# Patient Record
Sex: FEMALE | Race: WHITE | NOT HISPANIC OR LATINO | Employment: OTHER | URBAN - METROPOLITAN AREA
[De-identification: names, ages, dates, MRNs, and addresses within clinical notes are randomized per-mention and may not be internally consistent; named-entity substitution may affect disease eponyms.]

---

## 2017-01-31 ENCOUNTER — GENERIC CONVERSION - ENCOUNTER (OUTPATIENT)
Dept: OTHER | Facility: OTHER | Age: 67
End: 2017-01-31

## 2017-03-07 ENCOUNTER — APPOINTMENT (OUTPATIENT)
Dept: LAB | Facility: CLINIC | Age: 67
End: 2017-03-07
Payer: MEDICARE

## 2017-03-07 DIAGNOSIS — E55.9 VITAMIN D DEFICIENCY: ICD-10-CM

## 2017-03-07 DIAGNOSIS — I10 ESSENTIAL (PRIMARY) HYPERTENSION: ICD-10-CM

## 2017-03-07 DIAGNOSIS — Z00.00 ENCOUNTER FOR GENERAL ADULT MEDICAL EXAMINATION WITHOUT ABNORMAL FINDINGS: ICD-10-CM

## 2017-03-07 LAB
25(OH)D3 SERPL-MCNC: 32.7 NG/ML (ref 30–100)
ALBUMIN SERPL BCP-MCNC: 3.3 G/DL (ref 3.5–5)
ALP SERPL-CCNC: 47 U/L (ref 46–116)
ALT SERPL W P-5'-P-CCNC: 35 U/L (ref 12–78)
ANION GAP SERPL CALCULATED.3IONS-SCNC: 8 MMOL/L (ref 4–13)
AST SERPL W P-5'-P-CCNC: 18 U/L (ref 5–45)
BILIRUB SERPL-MCNC: 0.48 MG/DL (ref 0.2–1)
BUN SERPL-MCNC: 17 MG/DL (ref 5–25)
CALCIUM SERPL-MCNC: 8.5 MG/DL (ref 8.3–10.1)
CHLORIDE SERPL-SCNC: 106 MMOL/L (ref 100–108)
CHOLEST SERPL-MCNC: 170 MG/DL (ref 50–200)
CO2 SERPL-SCNC: 28 MMOL/L (ref 21–32)
CREAT SERPL-MCNC: 0.69 MG/DL (ref 0.6–1.3)
GFR SERPL CREATININE-BSD FRML MDRD: >60 ML/MIN/1.73SQ M
GLUCOSE SERPL-MCNC: 85 MG/DL (ref 65–140)
HDLC SERPL-MCNC: 86 MG/DL (ref 40–60)
LDLC SERPL CALC-MCNC: 75 MG/DL (ref 0–100)
POTASSIUM SERPL-SCNC: 3.8 MMOL/L (ref 3.5–5.3)
PROT SERPL-MCNC: 7 G/DL (ref 6.4–8.2)
SODIUM SERPL-SCNC: 142 MMOL/L (ref 136–145)
TRIGL SERPL-MCNC: 44 MG/DL
TSH SERPL DL<=0.05 MIU/L-ACNC: 2.83 UIU/ML (ref 0.36–3.74)

## 2017-03-07 PROCEDURE — 36415 COLL VENOUS BLD VENIPUNCTURE: CPT

## 2017-03-07 PROCEDURE — 84443 ASSAY THYROID STIM HORMONE: CPT

## 2017-03-07 PROCEDURE — 80053 COMPREHEN METABOLIC PANEL: CPT

## 2017-03-07 PROCEDURE — 82306 VITAMIN D 25 HYDROXY: CPT

## 2017-03-07 PROCEDURE — 80061 LIPID PANEL: CPT

## 2017-03-16 ENCOUNTER — ALLSCRIPTS OFFICE VISIT (OUTPATIENT)
Dept: OTHER | Facility: OTHER | Age: 67
End: 2017-03-16

## 2017-03-16 DIAGNOSIS — Z12.11 ENCOUNTER FOR SCREENING FOR MALIGNANT NEOPLASM OF COLON: ICD-10-CM

## 2017-03-16 DIAGNOSIS — R00.2 PALPITATIONS: ICD-10-CM

## 2017-03-16 DIAGNOSIS — R06.09 OTHER FORMS OF DYSPNEA: ICD-10-CM

## 2017-03-20 ENCOUNTER — TRANSCRIBE ORDERS (OUTPATIENT)
Dept: MRI IMAGING | Facility: CLINIC | Age: 67
End: 2017-03-20

## 2017-03-20 ENCOUNTER — GENERIC CONVERSION - ENCOUNTER (OUTPATIENT)
Dept: OTHER | Facility: OTHER | Age: 67
End: 2017-03-20

## 2017-03-20 ENCOUNTER — APPOINTMENT (OUTPATIENT)
Dept: LAB | Facility: CLINIC | Age: 67
End: 2017-03-20
Payer: MEDICARE

## 2017-03-20 ENCOUNTER — HOSPITAL ENCOUNTER (OUTPATIENT)
Dept: RADIOLOGY | Facility: CLINIC | Age: 67
Discharge: HOME | End: 2017-03-20
Payer: MEDICARE

## 2017-03-20 DIAGNOSIS — Z12.11 ENCOUNTER FOR SCREENING FOR MALIGNANT NEOPLASM OF COLON: ICD-10-CM

## 2017-03-20 DIAGNOSIS — R00.2 PALPITATIONS: ICD-10-CM

## 2017-03-20 LAB — HEMOCCULT STL QL IA: NEGATIVE

## 2017-03-20 PROCEDURE — G0328 FECAL BLOOD SCRN IMMUNOASSAY: HCPCS

## 2017-03-20 PROCEDURE — 71020 HB CHEST X-RAY 2VW FRONTAL&LATL: CPT

## 2017-03-22 ENCOUNTER — GENERIC CONVERSION - ENCOUNTER (OUTPATIENT)
Dept: OTHER | Facility: OTHER | Age: 67
End: 2017-03-22

## 2017-04-17 ENCOUNTER — GENERIC CONVERSION - ENCOUNTER (OUTPATIENT)
Dept: OTHER | Facility: OTHER | Age: 67
End: 2017-04-17

## 2017-04-17 ENCOUNTER — HOSPITAL ENCOUNTER (OUTPATIENT)
Dept: NON INVASIVE DIAGNOSTICS | Facility: CLINIC | Age: 67
Discharge: HOME | End: 2017-04-17
Payer: MEDICARE

## 2017-04-17 DIAGNOSIS — R00.2 PALPITATIONS: ICD-10-CM

## 2017-04-17 DIAGNOSIS — R06.09 OTHER FORMS OF DYSPNEA: ICD-10-CM

## 2017-04-17 LAB
CHEST PAIN STATEMENT: NORMAL
MAX DIASTOLIC BP: 90 MMHG
MAX HEART RATE: 176 BPM
MAX PREDICTED HEART RATE: 153 BPM
MAX. SYSTOLIC BP: 200 MMHG
PROTOCOL NAME: NORMAL
REASON FOR TERMINATION: NORMAL
TARGET HR FORMULA: NORMAL
TIME IN EXERCISE PHASE: 361 S

## 2017-04-17 PROCEDURE — 93226 XTRNL ECG REC<48 HR SCAN A/R: CPT

## 2017-04-17 PROCEDURE — 93350 STRESS TTE ONLY: CPT

## 2017-04-17 PROCEDURE — 93225 XTRNL ECG REC<48 HRS REC: CPT

## 2017-04-21 ENCOUNTER — GENERIC CONVERSION - ENCOUNTER (OUTPATIENT)
Dept: OTHER | Facility: OTHER | Age: 67
End: 2017-04-21

## 2017-05-01 ENCOUNTER — ALLSCRIPTS OFFICE VISIT (OUTPATIENT)
Dept: OTHER | Facility: OTHER | Age: 67
End: 2017-05-01

## 2017-05-02 ENCOUNTER — ALLSCRIPTS OFFICE VISIT (OUTPATIENT)
Dept: OTHER | Facility: OTHER | Age: 67
End: 2017-05-02

## 2017-10-07 ENCOUNTER — APPOINTMENT (EMERGENCY)
Dept: CT IMAGING | Facility: HOSPITAL | Age: 67
End: 2017-10-07
Payer: MEDICARE

## 2017-10-07 ENCOUNTER — HOSPITAL ENCOUNTER (EMERGENCY)
Facility: HOSPITAL | Age: 67
Discharge: HOME | End: 2017-10-07
Attending: EMERGENCY MEDICINE | Admitting: EMERGENCY MEDICINE
Payer: MEDICARE

## 2017-10-07 VITALS
TEMPERATURE: 97.5 F | SYSTOLIC BLOOD PRESSURE: 195 MMHG | DIASTOLIC BLOOD PRESSURE: 96 MMHG | HEART RATE: 76 BPM | OXYGEN SATURATION: 96 % | RESPIRATION RATE: 16 BRPM

## 2017-10-07 DIAGNOSIS — S00.83XA CONTUSION OF FACE, INITIAL ENCOUNTER: Primary | ICD-10-CM

## 2017-10-07 PROCEDURE — 99283 EMERGENCY DEPT VISIT LOW MDM: CPT

## 2017-10-07 PROCEDURE — 70450 CT HEAD/BRAIN W/O DYE: CPT

## 2017-10-07 PROCEDURE — 70486 CT MAXILLOFACIAL W/O DYE: CPT

## 2017-10-07 RX ORDER — IBUPROFEN 600 MG/1
600 TABLET ORAL EVERY 6 HOURS PRN
Qty: 30 TABLET | Refills: 0 | Status: SHIPPED | OUTPATIENT
Start: 2017-10-07 | End: 2018-10-25

## 2017-10-07 RX ORDER — METHOCARBAMOL 500 MG/1
1000 TABLET, FILM COATED ORAL ONCE
Status: COMPLETED | OUTPATIENT
Start: 2017-10-07 | End: 2017-10-07

## 2017-10-07 RX ORDER — IBUPROFEN 600 MG/1
600 TABLET ORAL ONCE
Status: COMPLETED | OUTPATIENT
Start: 2017-10-07 | End: 2017-10-07

## 2017-10-07 RX ORDER — METHOCARBAMOL 750 MG/1
750 TABLET, FILM COATED ORAL 3 TIMES DAILY
Qty: 15 TABLET | Refills: 0 | Status: SHIPPED | OUTPATIENT
Start: 2017-10-07 | End: 2018-10-25 | Stop reason: ALTCHOICE

## 2017-10-07 RX ADMIN — IBUPROFEN 600 MG: 600 TABLET ORAL at 18:20

## 2017-10-07 RX ADMIN — METHOCARBAMOL 1000 MG: 500 TABLET ORAL at 18:19

## 2017-10-07 NOTE — ED PROVIDER NOTES
History  Chief Complaint   Patient presents with    Fall     patient presents to the ED post fall with c/o right cheek and eye pain as well as b/l wrist pain  right cheek is ecchymotic  patient states she takes ASA daily      39-year-old female patient, on antiplatelet agents, presents to the emergency department for evaluation of right-sided facial contusion sustained in a fall on stairs  The patient states tripping, falling, striking her face on the steps  The patient does have some obvious deformity of her nose  The patient has some contusion and ecchymosis around the right eye  The patient has no ocular entrapment with all extraocular muscles intact  The patient's neurologic exam is benign  The patient's neck was cleared via nexus criteria  Patient of the CT scan done of the head and face  Patient does state some malocclusion however not is obviously apparent externally          History provided by:  Patient   used: No    Fall   Mechanism of injury: fall    Injury location:  Face and head/neck  Head/neck injury location:  Head  Facial injury location:  Face  Arrived directly from scene: yes    Fall:     Fall occurred:  Down stairs    Point of impact:  Head    Entrapped after fall: no    Protective equipment: none    Suspicion of alcohol use: no    Suspicion of drug use: no    Prior to arrival data:     Bystander interventions:  None    Patient ambulatory at scene: no      Blood loss:  None    Loss of consciousness: no      Amnesic to event: no      Airway interventions:  None    Breathing interventions:  None    IV access status:  None    IO access:  None    Fluids administered:  None    Airway condition since incident:  Stable    Breathing condition since incident:  Stable    Circulation condition since incident:  Stable  Associated symptoms: no abdominal pain, no back pain, no blindness, no difficulty breathing, no headaches, no hearing loss, no nausea, no neck pain and no seizures Risk factors: no AICD, no anticoagulation therapy, no CHF, no COPD, no kidney disease and no pacemaker        None       No past medical history on file  No past surgical history on file  No family history on file  I have reviewed and agree with the history as documented  Social History   Substance Use Topics    Smoking status: Not on file    Smokeless tobacco: Not on file    Alcohol use Not on file        Review of Systems   HENT: Negative for hearing loss  Eyes: Negative for blindness  Gastrointestinal: Negative for abdominal pain and nausea  Musculoskeletal: Negative for back pain and neck pain  Neurological: Negative for seizures and headaches  All other systems reviewed and are negative  Physical Exam  ED Triage Vitals [10/07/17 1658]   Temperature Pulse Respirations Blood Pressure SpO2   97 5 °F (36 4 °C) 75 16 (!) 195/96 96 %      Temp src Heart Rate Source Patient Position - Orthostatic VS BP Location FiO2 (%)   -- -- -- -- --      Pain Score       5           Physical Exam   Constitutional: She is oriented to person, place, and time  She appears well-developed and well-nourished  HENT:   Head: Normocephalic and atraumatic  Right Ear: External ear normal    Left Ear: External ear normal    Eyes: Conjunctivae and EOM are normal    Neck: No JVD present  No tracheal deviation present  No thyromegaly present  Cardiovascular: Normal rate  Pulmonary/Chest: Effort normal and breath sounds normal  No stridor  Abdominal: Soft  She exhibits no distension and no mass  There is no tenderness  There is no guarding  No hernia  Musculoskeletal: Normal range of motion  She exhibits no edema, tenderness or deformity  Lymphadenopathy:     She has no cervical adenopathy  Neurological: She is alert and oriented to person, place, and time  Skin: Skin is warm  No rash noted  No erythema  No pallor  Psychiatric: She has a normal mood and affect   Her behavior is normal    Nursing note and vitals reviewed  ED Medications  Medications   methocarbamol (ROBAXIN) tablet 1,000 mg (1,000 mg Oral Given 10/7/17 1819)   ibuprofen (MOTRIN) tablet 600 mg (600 mg Oral Given 10/7/17 1820)       Diagnostic Studies  Labs Reviewed - No data to display    CT facial bones without contrast   Final Result      Normal noncontrast CT of the facial bones  Workstation performed: [id number]         CT head without contrast   Final Result      No acute intracranial abnormality  Workstation performed: [id number]             Procedures  Procedures      Phone Contacts  ED Phone Contact    ED Course  ED Course                                MDM  Number of Diagnoses or Management Options  Contusion of face, initial encounter: new and requires workup     Amount and/or Complexity of Data Reviewed  Tests in the radiology section of CPT®: ordered and reviewed  Decide to obtain previous medical records or to obtain history from someone other than the patient: yes  Review and summarize past medical records: yes    Patient Progress  Patient progress: stable    CritCare Time    Disposition  Final diagnoses:   Contusion of face, initial encounter     ED Disposition     ED Disposition Condition Comment    Discharge  Ahmad Collar discharge to home/self care      Condition at discharge: Stable        Follow-up Information     Follow up With Specialties Details Why [address] [place], DO Family Medicine  As needed [address] [place] [address]  [phone]          Discharge Medication List as of 10/7/2017  5:57 PM      START taking these medications    Details   ibuprofen (MOTRIN) 600 mg tablet Take 1 tablet by mouth every 6 (six) hours as needed for mild pain for up to 3 days, Starting Sat 10/7/2017, Until Tue 10/10/2017, Print      methocarbamol (ROBAXIN) 750 mg tablet Take 1 tablet by mouth 3 (three) times a day for 5 days, Starting Sat 10/7/2017, Until Thu 10/12/2017, Print No discharge procedures on file      ED Provider  Electronically Signed by       Ashley John DO  10/09/17 5750

## 2017-11-03 ENCOUNTER — ALLSCRIPTS OFFICE VISIT (OUTPATIENT)
Dept: OTHER | Facility: OTHER | Age: 67
End: 2017-11-03

## 2017-11-03 DIAGNOSIS — I10 ESSENTIAL (PRIMARY) HYPERTENSION: ICD-10-CM

## 2017-12-14 ENCOUNTER — GENERIC CONVERSION - ENCOUNTER (OUTPATIENT)
Dept: OBGYN CLINIC | Facility: CLINIC | Age: 67
End: 2017-12-14

## 2017-12-20 ENCOUNTER — ALLSCRIPTS OFFICE VISIT (OUTPATIENT)
Dept: OTHER | Facility: OTHER | Age: 67
End: 2017-12-20

## 2017-12-21 NOTE — PROGRESS NOTES
Assessment   1  History of Asymptomatic postmenopausal state (V49 81) (Z78 0)   2  History of screening mammography (V15 89) (Z92 89)    Plan   Dense breasts, PMH: History of screening mammography    · MAMMO SCREENING BILATERAL W 3D & CAD; Status:Hold For - Scheduling; Requested    for:22Ifj4784;    Perform:[facility]; Due:67Dnr5290; Ordered; For:Dense breasts, PMH: History of screening mammography; Ordered By:Nel Brandon;           Call with questions or concerns       Discussion/Summary   Goals and Barriers: The patient has the current Goals: Hcm  The patent has the current Barriers: None  Patient's Capacity to Self-Care: Patient is able to Self-Care  Medication SE Review and Pt Understands Tx: Possible side effects of new medications were reviewed with the patient/guardian today  Self Referrals:    Self Referrals: Yes     Female, Adult: healthy adult female Currently, she eats an adequate diet  cervical cancer screening is current cervical cancer screening is not indicated Breast cancer screening: monthly self breast exam was advised, mammogram has been ordered and mammogram is needed every year  Colorectal cancer screening: colorectal cancer screening is current, colonoscopy is needed every ten years and the next colonoscopy is due 2020  Osteoporosis screening: the next bone mineral density test is due 2019 and Followed by PCP, nml BMD  Advice and education were given regarding weight bearing exercise, calcium supplements and vitamin D supplements  Chief Complaint   Chief Complaint Free Text Note Form: here for bi annual exam      History of Present Illness   HPI: Patient Is a 13-year-old postmenopausal female here for pelvic/breast exam  She was a previous [facility] patient  She denies postmenopausal bleeding  She denies abnormal Pap history  She denies vaginal symptoms  She would like to go to exams every 2 yrs if possible  GYN HM, Adult Female [facility]:  The patient is being seen for a gynecology evaluation  The last health maintenance visit was 2 year(s) ago  General Health: The patient's health since the last visit is described as good  Lifestyle:  She exercises regularly  Exercise includes walking -- She does not use tobacco  The patient has never smoked cigarettes  -- She consumes alcohol  She reports occasional alcohol use  Reproductive health: the patient is postmenopausal--   she reports no menstrual problems  Menstrual history:  age at menarche was 14 -- she uses no contraception  -- she is not sexually active  -- pregnancy history: G 3P 2(miscarriages: 1 )  Screening: Cervical cancer screening includes a pap smear performed 12/2014 per pt  Breast cancer screening includes a mammogram performed 12/6/16  Colorectal cancer screening includes a colonoscopy performed within the past ten years  Metabolic screening includes DEXA performed within the past five years  Review of Systems   Focused-Female:      Constitutional: No fever, no chills, feels well, no tiredness, no recent weight gain or loss  Breasts: no complaints of breast pain, breast lump or nipple discharge  Gastrointestinal: no complaints of abdominal pain, no constipation, no nausea or diarrhea, no vomiting, no bloody stools  Genitourinary: no complaints of dysuria, no incontinence, no pelvic pain, no dysmenorrhea, no vaginal discharge or abnormal vaginal bleeding  ROS Reviewed:    ROS reviewed  Active Problems   1  MIRELES (dyspnea on exertion) (786 09) (R06 09)   2  Hypertension (401 9) (I10)   3  Lumbar spine pain (724 2) (M54 5)   4  Malignant melanoma of skin (172 9) (C43 9)   5  Mild vitamin D deficiency (268 9) (E55 9)    Past Medical History   1  History of Asymptomatic postmenopausal state (V49 81) (Z78 0)   2  History of Avascular necrosis of hip (733 42) (M87 059)   3  History of dizziness (V13 89) (Z87 898)   4  History of influenza vaccination (V49 89) (Z92 29)   5   History of palpitations (V12 59) (Z87 898)   6  History of shortness of breath (V13 89) (Z87 898)   7  History of 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 (172 7) (C43 71)   8  History of Medicare annual wellness visit, subsequent (V70 0) (Z00 00)  Active Problems And Past Medical History Reviewed: The active problems and past medical history were reviewed and updated today  Pregnancy Medical History: 2 daughters, 3 yo old grandson and  granddaughter,1 daughter and  live abroad (he is an astrophysicist-just lost his luggage on trip home)    Medical history:      Surgical History   1  History of Biopsy Skin   2  History of Breast Surgery Puncture Aspiration Of Cyst   3  History of Dilation And Curettage   4  History of Hallux Valgus (Bunion) Correction   5  History of Hip Surgery Right  Surgical History Reviewed: The surgical history was reviewed and updated today  Family History   Mother    1  Family history of Adenocarcinoma Of The Gallbladder   2  Family history of hypertension (V17 49) (Z82 49)  Father    3  Family history of Brain Cancer (V16 8)   4  Family history of hypertension (V17 49) (Z82 49)  Brother    5  Family history of Liver Cancer  Family History Reviewed: The family history was reviewed and updated today  Patient denies family history of breast, colon, uterine, ovarian or cervical cancers  Social History    · Being A Social Drinker   · Never A Smoker   · Not currently sexually active  Social History Reviewed: The social history was reviewed and updated today  Current Meds    1  Aspirin 81 MG TABS; Therapy: (Recorded:88Fsl7344) to Recorded   2  B Complex TABS; Therapy: (Recorded:38Lvh5494) to Recorded   3  Calcium + D TABS; Therapy: (Recorded:20Ikq6552) to Recorded   4  Fish Oil 1000 MG Oral Capsule; 1 cap PO BID; Therapy: 06UCM1708 to (Evaluate:06Bvd0242) Recorded   5  Lisinopril 30 MG Oral Tablet;  Take 1 tablet by mouth daily; Therapy: 51YHI4185 to (Evaluate:17Sys1297)  Requested for: 56Wfp2931; Last     Rx:58Tdm1158 Ordered   6  Magnesium TABS; Therapy: (Recorded:79Hry6692) to Recorded   7  Montelukast Sodium 10 MG Oral Tablet; Take 1 tablet by mouth at bedtime; Therapy: 46WCK0533 to (Evaluate:19Sca0810)  Requested for: 98Ndb9842; Last     Rx:17Nmr6798 Ordered   8  Multi-Vitamin TABS; Therapy: (Recorded:35Pgl6154) to Recorded  Medication List Reviewed: The medication list was reviewed and updated today  Allergies   1  No Known Drug Allergies    Vitals   Vital Signs    Recorded: 06KSQ2880 68:73WP   Systolic 655, RUE, Sitting   Diastolic 82, RUE, Sitting   Height 5 ft 6 in   Weight 212 lb    BMI Calculated 34 22   BSA Calculated 2 05   LMP postmeno     Physical Exam        Constitutional      General appearance: No acute distress, well appearing and well nourished  Pulmonary      Respiratory effort: No increased work of breathing or signs of respiratory distress  Genitourinary      External genitalia: Normal and no lesions appreciated  Vagina: Normal, no lesions or dryness appreciated  Urethral meatus: Normal        Bladder: Normal, soft, non-tender and no prolapse or masses appreciated  Cervix: Normal, no palpable masses  Uterus: Normal, non-tender, not enlarged, and no palpable masses  Adnexa/parametria: Normal, non-tender and no fullness or masses appreciated  Chest      Breasts: Normal and no dimpling or skin changes noted  Abdomen      Abdomen: Normal, non-tender, and no organomegaly noted         Psychiatric      Orientation to person, place, and time: Normal        Mood and affect: Normal        Future Appointments      Date/Time Provider Specialty Site   05/04/2018 11:00 AM Wade Galvan  Internal Medicine [place] [address]    Electronically signed by : Wily Heller, [facility]; Dec 20 2017 11:59AM EST (Author)     Electronically signed by : Janine Chau MD; Dec 20 2017 12:21PM EST

## 2018-01-10 NOTE — RESULT NOTES
Verified Results  (B) PAP (REFLEX TO HPV PLUS WHEN ASC-US) [id number] 10:17AM Scott Rossi     Test Name Result Flag Reference   PAP, LIQUID-BASED NILM     DIAGNOSIS:            Negative for intraepithelial lesion or malignancy  ADEQUACY:             Satisfactory for evaluation / Transformation zone                         component cannot be adequately evaluated due to                         atrophic smear  COMMENT:              This Pap smear was screened with the assistance                         of the RedeemrPrep(TM) Imaging System and                         screened by a cytotechnologist   SPECIMEN SOURCE:      PAP (RFLX HPV PLUS WHENASC-US), CERVIX  CLINICAL INFORMATION: LMP: N/A                        Post menopausal                        Provided Diagnosis Codes: Z01 419, V72 31                                                Cervicovaginal cytology should be considered a                         screening procedure subject to false negatives                         and false positives  Results are more reliable                         when a satisfactory sample is obtained on a                         regular repetitive basis, and should be                         interpreted together with past and current                         clinical data    ELECTRONICALLY SIGNED   BY:                   Rescreened By: LITTLE Arrington (ASCP)   Case                         Electronically Signed 12/25/2016

## 2018-01-10 NOTE — PROGRESS NOTES
Assessment    1  Hypertension (401 9) (I10)   2  Medicare annual wellness visit, subsequent (V70 0) (Z00 00)   3  Encounter for HCV screening test for low risk patient (V73 89) (Z11 59)    Plan  Encounter for HCV screening test for low risk patient    · (Q) HEPATITIS C ANTIBODY; Status:Active; Requested TODD:[id number];   Hypertension    · (1) COMPREHENSIVE METABOLIC PANEL; Status:Active; Requested ROBY:[id number];    · (1) LIPID PANEL, FASTING; Status:Active; Requested for:03Nov2017;   Need for prophylactic vaccination and inoculation against influenza    · Fluzone High-Dose 0 5 ML Intramuscular Suspension Prefilled Syringe    Discussion/Summary    BP STABLE- CONTINUE TO MONITOR- GOAL : 140/80 OR LESS- OTHERWISE WILL INCREASE TO 40 MG DAILY  FALL- WAS IN ER FOR EVAL;   DEXA DONE 1/2-17- NORMAL BONE DENSITY;   FLU SHOT TODAY  ADACEL IS UP TO DATE;   CHECK HEP C  Impression: Subsequent Annual Wellness Visit, with preventive exam as well as age and risk appropriate counseling completed  Cardiovascular screening and counseling: counseling was given on maintaining a healthy diet, counseling was given on maintaining a healthy weight and Dx - V81 2 Screen for CV Disorder  Diabetes screening and counseling: counseling was given on maintaining a healthy diet, counseling was given on maintaining a healthy weight and Dx - V77 1 Screen for DM  Colorectal cancer screening and counseling: screening is current and Dx - V76 51 Screen for CRC  Breast cancer screening and counseling: due for a screening mammogram    Cervical cancer screening and counseling: screening is current  Osteoporosis screening and counseling: screening is current  Abdominal aortic aneurysm screening and counseling: Dx - V81 2 Screen for CV Disorder  Glaucoma screening and counseling: screening is current and Dx - V80 1 Screen for Glaucoma   Immunizations: influenza vaccine is up to date this year, influenza vaccination is recommended annually and hepatitis B vaccination series is not indicated at this time due to the patient's low risk of glenroy the disease  Advance Directive Planning: complete and up to date  Patient Discussion: plan discussed with the patient, follow-up visit needed in one year  The patient was counseled regarding diagnostic results, instructions for management, risk factor reductions, prognosis, patient and family education, impressions, risks and benefits of treatment options, importance of compliance with treatment  Chief Complaint  PATIENT HERE FOR FOLLOW UP; SHE FEELS WELL;      History of Present Illness  HPI: PATIENT HERE FOR FOLLOW UP;HER BP IS CREEPING UP- SHE IS ON LISINOPRIL 30 MG   Reviewed Updated Natalie Mcclain:   Last Medicare Wellness Visit Information was reviewed, patient interviewed, no change since last [facility]  Preventive Quality Program 65 and Older: Falls Risk: The patient fell 1 times in the past 12 months  The patient is currently asymptomatic Symptoms Include:  Associated symptoms:  pain from the injury   WENT TO ER- BETTER NOW  The patient currently has no urinary incontinence symptoms  Date of last glaucoma screen was 2017- NORMAL   Hypertension (Follow-Up): The patient presents for follow-up of essential hypertension and BP STABLE  The patient states she has been doing well with her blood pressure control since the last visit  She has no comorbid illnesses  She has no significant interval events  Symptoms: The patient is currently asymptomatic  Review of Systems    Over the past 2 weeks, how often have you been bothered by the following problems? 1 ) Little interest or pleasure in doing things? Not at all    2 ) Feeling down, depressed or hopeless? Not at all    3 ) Trouble falling asleep or sleeping too much? Not at all    4 ) Feeling tired or having little energy? Not at all    5 ) Poor appetite or overeating?  Not at all    6 ) Feeling bad about yourself, or that you are a failure, or have let yourself or your family down? Not at all    7 ) Trouble concentrating on things, such as reading a newspaper or watching television? Not at all    8 ) Moving or speaking so slowly that other people could have noticed, or the opposite, moving or speaking faster than usual? Not at all  How difficult have these problems made it for you to do your work, take care of things at home, or get along with people? Not at all  Score      Constitutional: No fever, no chills, feels well, no tiredness, no recent weight gain or weight loss, not feeling poorly and not feeling tired  Eyes: No complaints of eye pain, no red eyes, no eyesight problems, no discharge, no dry eyes, no itching of eyes, no eye pain, no eyesight problems, eyes not red and no purulent discharge from the eyes  ENT: no complaints of earache, no loss of hearing, no nose bleeds, no nasal discharge, no sore throat, no hoarseness, no earache, no nosebleeds, no hearing loss and no nasal discharge  Cardiovascular: No complaints of slow heart rate, no fast heart rate, no chest pain, no palpitations, no leg claudication, no lower extremity edema, the heart rate was not slow, no chest pain, the heart rate was not fast and no palpitations  Respiratory: No complaints of shortness of breath, no wheezing, no cough, no SOB on exertion, no orthopnea, no PND, no shortness of breath, no cough, no wheezing and no shortness of breath during exertion  Gastrointestinal: No complaints of abdominal pain, no constipation, no nausea or vomiting, no diarrhea, no bloody stools, no abdominal pain, no nausea, no constipation and no diarrhea  Genitourinary: No complaints of dysuria, no incontinence, no pelvic pain, no dysmenorrhea, no vaginal discharge or bleeding, no dysuria, no incontinence and no dysmenorrhea  Musculoskeletal: No complaints of arthralgias, no myalgias, no joint swelling or stiffness, no limb pain or swelling and no arthralgias     Integumentary: No complaints of skin rash or lesions, no itching, no skin wounds, no breast pain or lump, no rashes, no itching, no breast pain, no skin lesions and no skin wound  Neurological: No complaints of headache, no confusion, no convulsions, no numbness, no dizziness or fainting, no tingling, no limb weakness, no difficulty walking, no headache, no numbness, no confusion and no dizziness  Psychiatric: Not suicidal, no sleep disturbance, no anxiety or depression, no change in personality, no emotional problems  Endocrine: No complaints of proptosis, no hot flashes, no muscle weakness, no deepening of the voice, no feelings of weakness  Hematologic/Lymphatic: no swollen glands, no tendency for easy bleeding, no tendency for easy bruising and no swollen glands in the neck  ROS reviewed  Active Problems    1  Asymptomatic postmenopausal state (V49 81) (Z78 0)   2  Colon cancer screening (V76 51) (Z12 11)   3  MIRELES (dyspnea on exertion) (786 09) (R06 09)   4  Hypertension (401 9) (I10)   5  Lumbar spine pain (724 2) (M54 5)   6  Malignant melanoma of skin (172 9) (C43 9)   7  Medicare annual wellness visit, subsequent (V70 0) (Z00 00)   8   Mild vitamin D deficiency (268 9) (E55 9)    Past Medical History    · History of Avascular necrosis of hip (733 42) (M87 059)   · History of Breast cancer screening (V76 10) (Z12 39)   · History of Encounter for preventive health examination (V70 0) (Z00 00)   · History of Encounter for screening colonoscopy (V76 51) (Z12 11)   · History of dizziness (V13 89) (G82 042)   · History of palpitations (V12 59) (K48 386)   · History of shortness of breath (V13 89) (L65 862)   · History of Influenza vaccination administered at current visit (V04 81) (Z23)   · History of 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 (172 7) (C43 71)   · History of Need for prophylactic vaccination against Streptococcus pneumoniae  (pneumococcus) (V03 82) (Z23)    The active problems and past medical history were reviewed and updated today  Surgical History    · History of Biopsy Skin   · History of Breast Surgery Puncture Aspiration Of Cyst   · History of Dilation And Curettage   · History of Hallux Valgus (Bunion) Correction   · History of Hip Surgery Right    The surgical history was reviewed and updated today  Family History  Mother    · Family history of Adenocarcinoma Of The Gallbladder   · Family history of hypertension (V17 49) (Z82 49)  Father    · Family history of Brain Cancer (V16 8)   · Family history of hypertension (V17 49) (Z82 49)  Sister    · Family history of Malignant Melanoma Of The Skin (V16 8)  Brother    · Family history of Liver Cancer  Spouse    · Denied: Family history of substance abuse   · Denied: Family history of Mental problem    The family history was reviewed and updated today  Social History    · Being A Social Drinker   · Never A Smoker   · Never A Smoker   · Never smoked cigarettes (V49 89) (Z78 9)  The social history was reviewed and updated today  The social history was reviewed and is unchanged  Current Meds   1  Aspirin 81 MG TABS; Therapy: (Recorded:09Feb2016) to Recorded   2  B Complex TABS; Therapy: (Recorded:09Feb2016) to Recorded   3  Calcium + D TABS; Therapy: (Recorded:09Feb2016) to Recorded   4  Fish Oil 1000 MG Oral Capsule; 1 cap PO BID; Therapy: 30DOZ7913 to (Evaluate:12Xbm5799) Recorded   5  Lisinopril 30 MG Oral Tablet; Take 1 tablet by mouth daily; Therapy: 26NPH3156 to (Evaluate:06Arz1182)  Requested for: 05HED7175; Last   Rx:01Nov2017 Ordered   6  Magnesium TABS; Therapy: (Recorded:09Feb2016) to Recorded   7  Montelukast Sodium 10 MG Oral Tablet; Take 1 tablet by mouth at bedtime; Therapy: 29DTA0933 to (Evaluate:13Cyc3228)  Requested for: 05GIX5062; Last   Rx:01Nov2017 Ordered   8  Multi-Vitamin TABS; Therapy: (Recorded:09Feb2016) to Recorded    The medication list was reviewed and updated today  Allergies    1  No Known Drug Allergies    Immunizations   1 2    Influenza  14-Sep-2016  (66y)     PCV  23-Jul-2015  (64y) 14-Sep-2016  (66y)    Tdap  23-Jul-2015  (64y)     Zoster  2015      Vitals  Signs    Systolic: 561, LUE  Diastolic: 82, LUE   Temperature: 97 7 F  Heart Rate: 78  Respiration: 16  Systolic: 736  Diastolic: 82  Height: 5 ft 6 in  Weight: 210 lb   BMI Calculated: 33 9  BSA Calculated: 2 04    Physical Exam    Constitutional   General appearance: No acute distress, well appearing and well nourished  appears healthy, within normal limits of ideal weight, well hydrated and appearance reflects stated age  Eyes   Conjunctiva and lids: No swelling, erythema or discharge  Pupils and irises: Equal, round and reactive to light  Ears, Nose, Mouth, and Throat   External inspection of ears and nose: Normal     Otoscopic examination: Tympanic membranes translucent with normal light reflex  Canals patent without erythema  Nasal mucosa, septum, and turbinates: Normal without edema or erythema  Oropharynx: Normal with no erythema, edema, exudate or lesions  The posterior pharynx was erythematous, but did not have an exudate  Inspection of the oropharynx showed fully visible tonsils, uvula and soft palate (Mallampati class 1)  Oral mucosa was moist, but was normal  The palate examination showed no abnormalities  The tongue was normal  POST NASAL DRIP  Pulmonary   Respiratory effort: No increased work of breathing or signs of respiratory distress  Auscultation of lungs: Clear to auscultation  Auscultation of the lungs revealed no expiratory wheezing, normal expiratory time and no inspiratory wheezing  no rales or crackles were heard bilaterally  no rhonchi  no friction rub  no wheezing  no diminished breath sounds  no bronchial breath sounds  Cardiovascular   Palpation of heart: Normal PMI, no thrills  Auscultation of heart: Normal rate and rhythm, normal S1 and S2, without murmurs  Examination of extremities for edema and/or varicosities: Normal   no edema  Carotid pulses: Normal     Abdomen   Abdomen: Non-tender, no masses  Bowel sounds were normal  The abdomen was soft and nontender  no masses palpated  Liver and spleen: No hepatomegaly or splenomegaly  Lymphatic   Palpation of lymph nodes in neck: No lymphadenopathy  no posterior cervical node enlargement and no supraclavicular node enlargement  Musculoskeletal   Gait and station: Normal     Digits and nails: Normal without clubbing or cyanosis  Inspection/palpation of joints, bones, and muscles: Normal     Skin   Skin and subcutaneous tissue: Normal without rashes or lesions  Neurologic   Cranial nerves: Cranial nerves 2-12 intact  Reflexes: 2+ and symmetric  Sensation: No sensory loss  Psychiatric   Orientation to person, place, and time: Normal     Mood and affect: Normal          Results/Data  PHQ-2 Adult Depression Screening [id number] 12:52PM User, [facility]     Test Name Result Flag Reference   PHQ-2 Adult Depression Score 0     Over the last two weeks, how often have you been bothered by any of the following problems? Little interest or pleasure in doing things: Not at all - 0  Feeling down, depressed, or hopeless: Not at all - 0   PHQ-2 Adult Depression Screening Negative         Future Appointments    Date/Time Provider Specialty Site   05/04/2018 11:00 AM [facility] [id number] [facility]   12/20/2017 11:00 AM GERARDO Steel Obstetrics/Gynecology [facility]     Signatures   Electronically signed by :  Saul Cummins DO; Nov  3 2017  3:16PM EST                       (Author)

## 2018-01-12 VITALS
SYSTOLIC BLOOD PRESSURE: 122 MMHG | WEIGHT: 206.31 LBS | RESPIRATION RATE: 16 BRPM | DIASTOLIC BLOOD PRESSURE: 78 MMHG | TEMPERATURE: 98 F | HEART RATE: 88 BPM | OXYGEN SATURATION: 95 % | HEIGHT: 66 IN | BODY MASS INDEX: 33.16 KG/M2

## 2018-01-12 VITALS
BODY MASS INDEX: 32.78 KG/M2 | HEIGHT: 66 IN | RESPIRATION RATE: 18 BRPM | DIASTOLIC BLOOD PRESSURE: 78 MMHG | TEMPERATURE: 98.3 F | WEIGHT: 204 LBS | HEART RATE: 82 BPM | SYSTOLIC BLOOD PRESSURE: 124 MMHG

## 2018-01-12 NOTE — RESULT NOTES
Verified Results  * XR CHEST PA & LATERAL [id number] 09:03AM Neda Perez Order Number: [id number]     Test Name Result Flag Reference   XR CHEST PA & LATERAL (Report)     CHEST      INDICATION: Palpitations  Shortness of breath  COMPARISON: Chest x-ray dated July 26, 2013  VIEWS: Frontal and lateral projections     IMAGES: 2     FINDINGS:        Cardiomediastinal silhouette appears unremarkable  The lungs are clear  No pneumothorax or pleural effusion  Visualized osseous structures appear within normal limits for the patient's age  IMPRESSION:     No active pulmonary disease         Workstation performed: [id number]     Signed by:   Sebastian Rm MD   3/22/17

## 2018-01-13 VITALS
HEIGHT: 66 IN | SYSTOLIC BLOOD PRESSURE: 138 MMHG | HEART RATE: 80 BPM | WEIGHT: 203.8 LBS | DIASTOLIC BLOOD PRESSURE: 82 MMHG | RESPIRATION RATE: 18 BRPM | TEMPERATURE: 98.3 F | BODY MASS INDEX: 32.75 KG/M2

## 2018-01-14 VITALS
HEIGHT: 66 IN | WEIGHT: 210 LBS | HEART RATE: 78 BPM | RESPIRATION RATE: 16 BRPM | DIASTOLIC BLOOD PRESSURE: 82 MMHG | BODY MASS INDEX: 33.75 KG/M2 | TEMPERATURE: 97.7 F | SYSTOLIC BLOOD PRESSURE: 132 MMHG

## 2018-01-15 NOTE — RESULT NOTES
Verified Results  HOLTER MONITOR - 24 HOUR [id number] 08:34AM [place] [address] 148 Order Number: [id number]    - Patient Instructions: To schedule this appointment, please contact Central Scheduling at [phone]  Test Name Result Flag Reference   HOLTER MONITOR - 24 HOUR (Report)     PT NAME: Cynthia Garner   : 1950 AGE: 78 y o  GENDER: female   MRN: [id number]  PROCEDURE: Holter monitor - 24 hour       INDICATIONS: Palpitations      DESCRIPTION OF FINDINGS:   The patient was monitored for a total of 23 hours and 59 minutes  Predominant rhythm throughout the tracing noted to be normal sinus rhythm with an average heart rate of 76 beats per minute  A minimum heart rate of 49 beats per minute was noted at 5:56    AM with a maximum heart rate of 124 beats per minute noted at 12:18 PM      Rare ventricular ectopic activity consisted of 55 single PVCs  No sustained ventricular rhythms were noted  Rare supraventricular ectopic activity consisted of 12 single PACs  No sustained supraventricular rhythms were noted  There was no evidence of atrial fibrillation or atrial flutter  There were no significant pauses or high grade AV block  Normal diurnal heart rate variation  1  Predominant rhythm noted throughout the tracing was normal sinus rhythm with an average heart rate of 76 bpm    2  Rare ventricular ectopic activity without any sustained ventricular rhythms  3  Rare supraventricular ectopic activity without any sustained supraventricular rhythms  4  The patient did return a diary with the monitor and no symptoms were noted throughout the monitoring period

## 2018-01-16 NOTE — RESULT NOTES
Message   TEST FOR COLON CANCER IS NEGATIVE         Verified Results  (1) OCCULT BLOOD, FECAL IMMUNOCHEMICAL TEST 20Mar2017 08:53AM Alessandra Castaneda    Order Number: [id number]     Test Name Result Flag Reference   OCCULT BLD, FECAL IMMUNOLOGICAL Negative  Negative   Performed by Fecal Immunochemical Test

## 2018-01-16 NOTE — PROGRESS NOTES
Assessment    1  Encounter for preventive health examination (V70 0) (Z00 00)   2  Hypertension (401 9) (I10)   3  History of Hallux Valgus (Bunion) Correction    Plan  Breast cancer screening    · * MAMMO SCREENING BILATERAL W CAD; Status:Hold For - Scheduling,Retrospective  By Protocol Authorization; Requested for:[id number];   Encounter for preventive health examination    · (1) COMPREHENSIVE METABOLIC PANEL; Status:Active; Requested for:13Fsd6288;   Hypertension    · (1) LIPID PANEL, FASTING; Status:Active; Requested for:43Kxl5334;    · (1) TSH; Status:Active; Requested for:82Qxm8653;    · Follow-up visit in 6 months Evaluation and Treatment  Follow-up  Status: Hold For -  Scheduling  Requested for: 00Ipo1161  Mild vitamin D deficiency    · (1) VITAMIN D 25-HYDROXY; Status:Active; Requested for:08Uhv5107;   Osteoporosis screening    · * DXA BONE DENSITY SPINE HIP AND PELVIS; Status:Hold For -  Scheduling,Retrospective By Protocol Authorization; Requested for:96Rpd2885;   Screening for genitourinary condition    · *VB - Urinary Incontinence Screen (Dx V81 6 Screen for UI); Status:Complete -  Retrospective By Protocol Authorization;   Done: 14QZR0445 12:00AM    Discussion/Summary    FLU SHOT TODAY  PNEUMOVAX TODAY  OBTAIN LABS  MAMMO UP TO DATE  CONTINUE LISINOPRIL 30 MG DAILY FOLLOW UP 6 MONTHS  Impression: Subsequent Annual Wellness Visit, with preventive exam as well as age and risk appropriate counseling completed  Cardiovascular screening and counseling: the risks and benefits of screening were discussed and Dx - V81 2 Screen for CV Disorder  Diabetes screening and counseling: Dx - V77 1 Screen for DM  Colorectal cancer screening and counseling: screening is current and Dx - V76 51 Screen for CRC  Breast cancer screening and counseling: screening is current  Abdominal aortic aneurysm screening and counseling: Dx - V81 2 Screen for CV Disorder     Glaucoma screening and counseling: Dx - V80 1 Screen for Glaucoma  HIV screening and counseling: screening not indicated  Immunizations: influenza vaccination is recommended annually, the lifetime pneumococcal vaccine has been completed, pneumococcal vaccine due today, hepatitis B vaccination series is not indicated at this time due to the patient's low risk of glenroy the disease, Zostavax vaccination up to date and Td vaccination up to date  Chief Complaint  PATIENT HERE FOR AMW; SHE IS TAKING LISINOPRIL 30 MG ADN DOING WELL;      History of Present Illness  HPI: PATIENT HERE FOR AMW; SHE FEELS WELL;   Welcome to [facility] [facility] and Wellness Visits: The patient is being seen for the subsequent annual wellness visit  Medicare Screening and Risk Factors   Hospitalizations: no previous hospitalizations  Once per lifetime medicare screening tests: ECG has not been done  Medicare Screening Tests Risk Questions   Abdominal aortic aneurysm risk assessment: none indicated  Osteoporosis risk assessment: none indicated  HIV risk assessment: none indicated  Drug and Alcohol Use: The patient has never smoked cigarettes  The patient reports rare alcohol use  She has never used illicit drugs  Diet and Physical Activity: Current diet includes well balanced meals  She exercises daily  Exercise: walking  Mood Disorder and Cognitive Impairment Screening:   Depression screening  negative for symptoms  She denies feeling down, depressed, or hopeless over the past two weeks  She denies feeling little interest or pleasure in doing things over the past two weeks  Cognitive impairment screening: denies difficulty learning/retaining new information, denies difficulty handling complex tasks, denies difficulty with reasoning, denies difficulty with spatial ability and orientation, denies difficulty with language and denies difficulty with behavior  Functional Ability/Level of Safety: Hearing is normal bilaterally   The patient is currently able to do activities of daily living without limitations, able to do instrumental activities of daily living without limitations and able to participate in social activities without limitations  Activities of daily living details: does not need help using the phone, no transportation help needed, does not need help shopping, no meal preparation help needed, does not need help doing housework, does not need help doing laundry, does not need help managing medications and does not need help managing money  Fall risk factors:  antihypertensive use, but The patient fell 0 times in the past 12 months , no polypharmacy, no alcohol use, no mobility impairment, no antidepressant use, no deconditioning, no postural hypotension, no sedative use, no visual impairment, no urinary incontinence, no cognitive impairment and no previous fall  Home safety risk factors:  no unfamiliar surroundings, no loose rugs, no poor household lighting, no uneven floors, no household clutter and grab bars in the bathroom  Advance Directives: Advance directives: no living will, no durable power of  for health care directives and no advance directives  I agree with the patient's decisions  Co-Managers and Medical Equipment/Suppliers: See Patient Care Team   Preventive Quality Program 65 and Older: Falls Risk: The patient fell 0 times in the past 12 months  The patient is currently asymptomatic Symptoms Include: The patient currently has no urinary incontinence symptoms  Date of last glaucoma screen was 1   HM, Adult Female: The patient is being seen for a health maintenance evaluation  General Health: The patient's health since the last visit is described as good  She has regular dental visits  She denies vision problems  She denies hearing loss  Immunizations status: up to date  Lifestyle:  She consumes a diverse and healthy diet  She does not have any weight concerns  She exercises regularly  She does not use tobacco  She denies alcohol use  She denies drug use  Reproductive health:  she reports normal menses  Screening: cancer screening reviewed and current  metabolic screening reviewed and current  risk screening reviewed and current  Welcome to [employer] and Wellness Visits: The patient is being seen for a subsequent annual wellness visit  The patient reports her health to be good  Tobacco use: non-user  Alcohol use: as noted in social history  Illicit drug use: non-user  Current diet includes well balanced meals  She exercises daily  There is no cognitive impairment  Hypertension (Follow-Up): The patient states she has been doing well with her blood pressure control since the last visit  She has no comorbid illnesses  She has no significant interval events  Symptoms: The patient is currently asymptomatic  Review of Systems    Head and Face: negative  Eyes: negative  ENT: negative, no nasal congestion, no nasal discharge, no sneezing, no sore throat, no scratchy throat and no hoarseness  Cardiovascular: negative, no chest pain, no palpitations, the heart is not racing, no lightheadedness and no lower extremity edema  Respiratory: negative, no shortness of breath, no wheezing, not sleeping upright or with extra pillows, no cough, no dry cough and no productive cough  Gastrointestinal: negative, no abdominal pain, no abdominal bloating, no abdominal cramps, no nausea, no vomiting, no diarrhea, no constipation and no bright red blood per rectum  Genitourinary: negative, no dysuria, no urinary frequency, no urinary urgency, no suprapubic pain, no pelvic pain and no dark urine  Musculoskeletal: negative, no diffuse joint pain, no generalized muscle aches, no joint swelling, no joint stiffness, no back muscle spasm, no pain in other joints and no limping  Integumentary and Breasts: negative, no rashes, no skin lesions, no painful skin area with a rash or sore and no mouth sores     Neurological: negative, no headache, no confusion, no dizziness, no paresthesias and no tingling  Psychiatric: negative, no insomnia, no anxiety and no depression  Endocrine: negative, no hot flashes, no muscle weakness and no generalized weakness  Hematologic and Lymphatic: negative, no swollen glands, no swollen glands in the neck, no tendency for easy bleeding, no tendency for easy bruising and no jaundice  Active Problems    1  Breast cancer screening (V76 10) (Z12 39)   2  Encounter for preventive health examination (V70 0) (Z00 00)   3  Encounter for screening colonoscopy (V76 51) (Z12 11)   4  Hypertension (401 9) (I10)   5  Lumbar spine pain (724 2) (M54 5)   6  Mild vitamin D deficiency (268 9) (E55 9)   7  Neck pain (723 1) (M54 2)    Past Medical History    · History of Avascular necrosis of hip (733 42) (M87 059)   · History of dizziness (V13 89) (F84 171)   · History of malignant melanoma of skin (V10 82) (Z85 820)   · History of shortness of breath (V13 89) (H22 789)   · History of 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 (172 7) (C43 71)   · History of Need for prophylactic vaccination against diphtheria-tetanus-pertussis (DTP)  (V06 1) (Z23)   · History of Need for prophylactic vaccination against Streptococcus pneumoniae  (pneumococcus) (V03 82) (Z23)    The active problems and past medical history were reviewed and updated today  Surgical History    · History of Biopsy Skin   · History of Dilation And Curettage   · History of Hallux Valgus (Bunion) Correction   · History of Hip Surgery Right    The surgical history was reviewed and updated today         Family History  Mother    · Family history of Adenocarcinoma Of The Gallbladder   · Family history of hypertension (V17 49) (Z82 49)  Father    · Family history of Brain Cancer (V16 8)   · Family history of hypertension (V17 49) (Z82 49)  Sister    · Family history of Malignant Melanoma Of The Skin (V16 8)  Brother    · Family history of Liver Cancer    The family history was reviewed and updated today  Social History    · Being A Social Drinker   · Never A Smoker   · Never A Smoker  The social history was reviewed and updated today  The social history was reviewed and is unchanged  Current Meds   1  Aspirin 81 MG TABS; Therapy: (Recorded:09Feb2016) to Recorded   2  B Complex TABS; Therapy: (Recorded:09Feb2016) to Recorded   3  Calcium + D TABS; Therapy: (Recorded:09Feb2016) to Recorded   4  Fish Oil 1000 MG Oral Capsule; 1 cap PO BID; Therapy: 94XRE8699 to (Evaluate:47Kuk8861) Recorded   5  Lisinopril 30 MG Oral Tablet; Take 1 tablet by mouth daily; Therapy: 30ZIR9631 to (Evaluate:03Jan2017)  Requested for: 61AFS8596; Last   Rx:04Yti9829 Ordered   6  Magnesium TABS; Therapy: (Recorded:09Feb2016) to Recorded   7  Multi-Vitamin TABS; Therapy: (Recorded:09Feb2016) to Recorded    Allergies    1  No Known Drug Allergies    Immunizations   1    PCV  23-Jul-2015  (64y)    Tdap  23-Jul-2015  (64y)    Zoster  2015     Vitals  Signs    Systolic: 165  Diastolic: 76  Heart Rate: 80  Respiration: 16  Temperature: 98 9 F  Height: 5 ft 6 in  Weight: 196 lb   BMI Calculated: 31 63  BSA Calculated: 1 99    Physical Exam    Constitutional   General appearance: No acute distress, well appearing and well nourished  WDWN FEMALE WHO APPEARS YOUNGER THAN STATED AGE  Eyes   Conjunctiva and lids: No swelling, erythema or discharge  Pupils and irises: Equal, round, reactive to light  Ears, Nose, Mouth, and Throat   External inspection of ears and nose: Normal     Otoscopic examination: Tympanic membranes translucent with normal light reflex  Canals patent without erythema  Hearing: Normal     Nasal mucosa, septum, and turbinates: Normal without edema or erythema  Lips, teeth, and gums: Normal, good dentition  Oropharynx: Normal with no erythema, edema, exudate or lesions  CLEAR OROPHARYNX     Neck   Neck: Supple, symmetric, trachea midline, no masses  Thyroid: Normal, no thyromegaly  Pulmonary   Respiratory effort: No increased work of breathing or signs of respiratory distress  Percussion of chest: Normal     Auscultation of lungs: Clear to auscultation  Auscultation of the lungs revealed no expiratory wheezing, normal expiratory time and no inspiratory wheezing  no rales or crackles were heard bilaterally  no rhonchi  no friction rub  no wheezing  no diminished breath sounds  no bronchial breath sounds  Cardiovascular   Palpation of heart: Normal PMI, no thrills  Auscultation of heart: Normal rate and rhythm, normal S1 and S2, no murmurs  The heart rate was normal  The rhythm was regular  Heart sounds: normal S1, normal S2, no S3 and no S4  no murmurs were heard  Carotid pulses: 2+ bilaterally  Abdominal aorta: Normal     Examination of extremities for edema and/or varicosities: Normal     Abdomen   Abdomen: Non-tender, no masses  Liver and spleen: No hepatomegaly or splenomegaly  Lymphatic   Palpation of lymph nodes in neck: No lymphadenopathy  Musculoskeletal   Gait and station: Normal     Digits and nails: Normal without clubbing or cyanosis  Joints, bones, and muscles: Normal     Range of motion: Normal     Stability: Normal     Muscle strength/tone: Normal     Skin   Skin and subcutaneous tissue: Normal without rashes or lesions  Palpation of skin and subcutaneous tissue: Normal turgor  Neurologic   Cranial nerves: Cranial nerves II-XII intact  Reflexes: 2+ and symmetric  Sensation: No sensory loss  Psychiatric   Judgment and insight: Normal     Orientation to person, place, and time: Normal     Recent and remote memory: Intact      Mood and affect: Normal        Results/Data  Falls Risk Assessment (Dx Z13 89 Screen for Neurologic Disorder) 10OIT0240 01:10PM User, s     Test Name Result Flag Reference   Falls Risk      No falls in the past year     PHQ-2 Adult Depression Screening 07SAE3984 01:09PM User, s     Test Name Result Flag Reference   PHQ-2 Adult Depression Score 0     Over the last two weeks, how often have you been bothered by any of the following problems? Little interest or pleasure in doing things: Not at all - 0  Feeling down, depressed, or hopeless: Not at all - 0   PHQ-2 Adult Depression Screening Negative         Future Appointments    Date/Time Provider Specialty Site   10/11/2016 11:30 AM Flory Jaimes MD Surgical Oncology [facility]     Signatures   Electronically signed by :  Saul Cummins DO; Sep 14 2016  1:33PM EST                       (Author)

## 2018-01-18 NOTE — RESULT NOTES
Verified Results  ECHO STRESS TEST W CONTRAST IF INDICATED [id number] 08:34AM Chelly Ceballos    Order Number: [id number]    - Patient Instructions: To schedule this appointment, please contact Central Scheduling at [phone]  Test Name Result Flag Reference   ECHO STRESS TEST W CONTRAST IF INDICATED (Report)     [address], [address] [facility]   [phone]     Exercise Stress Echocardiography     Study date: 2017     Patient: Nic Graves   MR number: [id number]   Account number: [de-identified]   : 1950   Age: 78 years   Gender: Female   Study date: 2017   Status: Outpatient   Location: [address] [place] [address] Heart and Vascular [facility] lab   Height: 66 in   Weight: 203 lb   BP: 130// 80 mmHg     Indications: Detection of coronary artery disease  Assessment of left ventricular function  Diagnosis: R06 09 - Other forms of dyspnea     Sonographer: VADIM Downs   Primary Physician: Gladis De La Fuente DO   Referring Physician: Gladis De La Fuente DO   [facility]   RN: Laurie Matthews RN   EKG Technician: Jose Mahajan   Interpreting Physician: Radha Hamilton DO     IMPRESSIONS:   Left ventricular systolic function was normal      SUMMARY     STRESS RESULTS:   Duration of exercise was 6 min and 1 sec  Maximal work rate was 7 METs  Maximal heart rate during stress was 176 bpm ( 115 % of maximal predicted heart rate)  Target heart rate was achieved  There was resting hypertension with a hypertensive blood pressure response to stress  There was no chest pain during stress  ECG CONCLUSIONS:   The stress ECG was negative for ischemia  BASELINE:   There were no regional wall motion abnormalities  Estimated left ventricular ejection fraction was 65 %   ECHO CONCLUSIONS:   There was no echocardiographic evidence for stress-induced ischemia       HISTORY: The patient is a 78year old  female  Other symptoms: dyspnea of recent onset and palpitations  Coronary artery disease risk factors: hypertension and family history of coronary artery disease  Co-morbidity: obesity  Medications: aspirin and an antihypertensive agent  REST ECG: Normal sinus rhythm  PROCEDURE: The study was performed in the stress lab  The study was performed in the [address] [facility]  The procedure was explained to the patient and informed consent was obtained  Treadmill exercise testing was performed,   using the Taylor protocol  Stress and rest echocardiographic evaluation with 2D imaging, spectral Doppler, and color Doppler was performed from multiple acoustic windows for evaluation of ventricular function  TAYLOR PROTOCOL:   HR bpm SBP mmHg DBP mmHg Symptoms   Baseline 90 130 80 none   Stage 1 118 180 90 none   Stage 2 148 180 90 mild dyspnea   Recovery 1 85 160 78 subsiding     STRESS RESULTS: Duration of exercise was 6 min and 1 sec  The patient exercised to protocol stage 2  Maximal work rate was 7 METs  Maximal heart rate during stress was 176 bpm ( 115 % of maximal predicted heart rate)  Target heart rate was   achieved  The heart rate response to stress was exaggerated  Maximal systolic blood pressure during stress was 200 mmHg  There was resting hypertension with a hypertensive blood pressure response to stress  The rate-pressure product for   the peak heart rate and blood pressure was 41629  There was no chest pain during stress  The stress test was terminated due to moderate dyspnea  After the procedure, the patient was discharged to home  ECG CONCLUSIONS: The stress ECG was negative for ischemia  STRESS 2D ECHO RESULTS:     BASELINE: There were no regional wall motion abnormalities  Estimated left ventricular ejection fraction was 65 %        OTHER ECHO FINDINGS: There was mild left ventricular hypertrophy, minimal increase LVOT velocity to 1 8-2m/second peak exercise  ECHO CONCLUSIONS: There was no echocardiographic evidence for stress-induced ischemia  SYSTEM MEASUREMENT TABLES     2D   LVOT Diam: 1 95 cm     PW   HR: 62 91 BPM   LVCO Dopp: 5 8 L/min   LVOT Env  Ti: 332 72 ms   LVOT VTI: 30 87 cm   LVOT Vmax: 1 16 m/s   LVOT Vmean: 0 93 m/s   LVOT maxP 42 mmHg   LVOT meanPG: 3 79 mmHg   LVSV Dopp: 92 22 ml     Prepared and electronically signed by     Jenn Villarreal DO   Signed 2017 10:24:48

## 2018-01-23 VITALS
DIASTOLIC BLOOD PRESSURE: 82 MMHG | HEIGHT: 66 IN | SYSTOLIC BLOOD PRESSURE: 122 MMHG | BODY MASS INDEX: 34.07 KG/M2 | WEIGHT: 212 LBS

## 2018-02-25 DIAGNOSIS — J30.9 CHRONIC ALLERGIC RHINITIS, UNSPECIFIED SEASONALITY, UNSPECIFIED TRIGGER: Primary | ICD-10-CM

## 2018-02-26 RX ORDER — MONTELUKAST SODIUM 10 MG/1
TABLET ORAL
Qty: 30 TABLET | Refills: 1 | Status: SHIPPED | OUTPATIENT
Start: 2018-02-26 | End: 2018-04-03 | Stop reason: SDUPTHER

## 2018-03-22 ENCOUNTER — TELEPHONE (OUTPATIENT)
Dept: OBGYN CLINIC | Facility: CLINIC | Age: 68
End: 2018-03-22

## 2018-03-22 DIAGNOSIS — K62.5 BLEEDING PER RECTUM: Primary | ICD-10-CM

## 2018-03-22 NOTE — TELEPHONE ENCOUNTER
Pt is a pt of Brenda, says she is having bleeding from her rectum  Not sure if it's a hemmorid (sorry the spelling is so bad), blood when wiping  Started yesterday and hasn't gotten better or worse  Requests a call back for what she should do

## 2018-03-22 NOTE — TELEPHONE ENCOUNTER
Spoke with pt - yesterday she had bleeding form her rectum - felt a lump like a pimple on skin  When she went later on that day, still saw blood on tissue, but it was only a little  No blood today and the lumps has gone down  Feels like a scab is over it  No itching  She will call her PCP to see what needs to be done, but wanted to get advise from Sue 4  Please advise  Thanks!

## 2018-03-23 ENCOUNTER — OFFICE VISIT (OUTPATIENT)
Dept: FAMILY MEDICINE CLINIC | Facility: CLINIC | Age: 68
End: 2018-03-23
Payer: MEDICARE

## 2018-03-23 VITALS
WEIGHT: 207 LBS | BODY MASS INDEX: 33.27 KG/M2 | TEMPERATURE: 99.2 F | DIASTOLIC BLOOD PRESSURE: 84 MMHG | SYSTOLIC BLOOD PRESSURE: 120 MMHG | HEIGHT: 66 IN | HEART RATE: 82 BPM

## 2018-03-23 DIAGNOSIS — K64.5 HEMORRHOID THROMBOSIS: Primary | ICD-10-CM

## 2018-03-23 PROCEDURE — 99213 OFFICE O/P EST LOW 20 MIN: CPT | Performed by: INTERNAL MEDICINE

## 2018-03-23 PROCEDURE — 46600 DIAGNOSTIC ANOSCOPY SPX: CPT | Performed by: INTERNAL MEDICINE

## 2018-03-23 RX ORDER — MULTIVITAMIN
TABLET ORAL
COMMUNITY
End: 2018-10-25

## 2018-03-23 RX ORDER — LISINOPRIL 30 MG/1
TABLET ORAL
COMMUNITY
Start: 2018-03-21 | End: 2018-05-11 | Stop reason: SDUPTHER

## 2018-03-23 RX ORDER — LANOLIN ALCOHOL/MO/W.PET/CERES
CREAM (GRAM) TOPICAL
COMMUNITY

## 2018-03-23 RX ORDER — TRIAMCINOLONE ACETONIDE 5 MG/G
CREAM TOPICAL
Qty: 30 G | Refills: 0 | Status: SHIPPED | OUTPATIENT
Start: 2018-03-23 | End: 2019-04-25 | Stop reason: ALTCHOICE

## 2018-03-23 RX ORDER — VITAMIN B COMPLEX
TABLET ORAL
COMMUNITY
End: 2018-10-25

## 2018-03-23 RX ORDER — CHLORAL HYDRATE 500 MG
CAPSULE ORAL
COMMUNITY
Start: 2015-07-23 | End: 2018-10-25

## 2018-03-23 NOTE — TELEPHONE ENCOUNTER
Spoke with pt - she has an appointment with her PCP today  Is currently not bleeding  Mentioned to her, if she doesn't want to go to her PCP, Brenda recommended a GI Dr  Referral in [place] and mailed to pt

## 2018-03-23 NOTE — PROGRESS NOTES
ASSESSMENT and PLAN:  Fiona Sandoval is a 75 y o  female with:   Problem List Items Addressed This Visit     Hemorrhoid thrombosis - Primary    Relevant Medications    triamcinolone (KENALOG) 0 5 % cream          SUBJECTIVE:  Fiona Sandoval is a 75 y o  female who presents today with a chief complaint of Rectal Bleeding (bleeding two days ago)    2 days ago had bm-  Saw bloood on toilet paper-  Saw something round near her rectum  Had bleeding twice that day- none since- the rounded area is gone;  Blood was bright red; No blood on the stool   No blood in toilet; No straining with stool; no constipation; no ab pain  She had colono in 2010- dr Harper Sen int and ext hemorrhoids;       PROC      Review of Systems   Constitutional: Negative  HENT: Negative  Respiratory: Negative  Cardiovascular: Negative  Gastrointestinal: Positive for anal bleeding and blood in stool  Negative for abdominal distention, abdominal pain, constipation, diarrhea, nausea and rectal pain  Endocrine: Negative  Genitourinary: Negative  Musculoskeletal: Negative  Skin: Negative  Neurological: Negative  Hematological: Negative  Psychiatric/Behavioral: Negative  Procedures  I have reviewed the patient's PMH, Social History, Medication List and Allergies  OBJECTIVE:  /84 (BP Location: Left arm, Patient Position: Sitting, Cuff Size: Large)   Pulse 82   Temp 99 2 °F (37 3 °C)   Ht 5' 6" (1 676 m)   Wt 93 9 kg (207 lb)   Breastfeeding? No   BMI 33 41 kg/m²   Physical Exam   Constitutional: She is oriented to person, place, and time  She appears well-developed and well-nourished  HENT:   Head: Normocephalic  Right Ear: External ear normal    Eyes: Conjunctivae are normal  Pupils are equal, round, and reactive to light  Neck: Normal range of motion  Neck supple  Cardiovascular: Normal rate and normal heart sounds      Pulmonary/Chest: Effort normal and breath sounds normal  Abdominal: Soft  Bowel sounds are normal  She exhibits no distension  There is no tenderness  There is no rebound  Genitourinary: Pelvic exam was performed with patient in the knee-chest position  Genitourinary Comments: SMALL ULCERATED HEMORRHOID  WITH SKIN TAG NOTED AT 3:00 POSITION    ANOSCOPY DONE-  NO BLEEDING   Musculoskeletal: Normal range of motion  Neurological: She is alert and oriented to person, place, and time  Psychiatric: Her behavior is normal  Thought content normal    Nursing note and vitals reviewed

## 2018-04-03 DIAGNOSIS — J30.9 CHRONIC ALLERGIC RHINITIS, UNSPECIFIED SEASONALITY, UNSPECIFIED TRIGGER: ICD-10-CM

## 2018-04-03 RX ORDER — MONTELUKAST SODIUM 10 MG/1
10 TABLET ORAL
Qty: 90 TABLET | Refills: 0 | Status: SHIPPED | OUTPATIENT
Start: 2018-04-03 | End: 2018-07-01 | Stop reason: SDUPTHER

## 2018-05-11 DIAGNOSIS — I10 HYPERTENSION, UNSPECIFIED TYPE: Primary | ICD-10-CM

## 2018-05-11 RX ORDER — LISINOPRIL 30 MG/1
30 TABLET ORAL DAILY
Qty: 30 TABLET | Refills: 2 | Status: SHIPPED | OUTPATIENT
Start: 2018-05-11 | End: 2018-05-15 | Stop reason: SDUPTHER

## 2018-05-15 DIAGNOSIS — I10 HYPERTENSION, UNSPECIFIED TYPE: ICD-10-CM

## 2018-05-15 RX ORDER — LISINOPRIL 30 MG/1
TABLET ORAL
Qty: 30 TABLET | Refills: 1 | Status: SHIPPED | OUTPATIENT
Start: 2018-05-15 | End: 2018-08-01 | Stop reason: SDUPTHER

## 2018-05-17 ENCOUNTER — HOSPITAL ENCOUNTER (EMERGENCY)
Facility: HOSPITAL | Age: 68
Discharge: HOME | End: 2018-05-17
Attending: EMERGENCY MEDICINE | Admitting: EMERGENCY MEDICINE
Payer: MEDICARE

## 2018-05-17 VITALS
DIASTOLIC BLOOD PRESSURE: 85 MMHG | HEART RATE: 69 BPM | HEIGHT: 67 IN | RESPIRATION RATE: 16 BRPM | BODY MASS INDEX: 32.96 KG/M2 | TEMPERATURE: 98.2 F | OXYGEN SATURATION: 96 % | WEIGHT: 210 LBS | SYSTOLIC BLOOD PRESSURE: 178 MMHG

## 2018-05-17 DIAGNOSIS — H66.92 LEFT OTITIS MEDIA: ICD-10-CM

## 2018-05-17 DIAGNOSIS — J40 BRONCHITIS: Primary | ICD-10-CM

## 2018-05-17 DIAGNOSIS — J02.9 PHARYNGITIS: ICD-10-CM

## 2018-05-17 PROCEDURE — 99283 EMERGENCY DEPT VISIT LOW MDM: CPT

## 2018-05-17 RX ORDER — AZITHROMYCIN 250 MG/1
500 TABLET, FILM COATED ORAL ONCE
Status: COMPLETED | OUTPATIENT
Start: 2018-05-17 | End: 2018-05-17

## 2018-05-17 RX ORDER — AZITHROMYCIN 250 MG/1
250 TABLET, FILM COATED ORAL DAILY
Qty: 4 TABLET | Refills: 0 | Status: SHIPPED | OUTPATIENT
Start: 2018-05-17 | End: 2018-05-21

## 2018-05-17 RX ADMIN — AZITHROMYCIN MONOHYDRATE 500 MG: 250 TABLET ORAL at 20:28

## 2018-05-18 NOTE — DISCHARGE INSTRUCTIONS
Acute Bronchitis   WHAT YOU NEED TO KNOW:   Acute bronchitis is swelling and irritation in the air passages of your lungs  This irritation may cause you to cough or have other breathing problems  Acute bronchitis often starts because of another illness, such as a cold or the flu  The illness spreads from your nose and throat to your windpipe and airways  Bronchitis is often called a chest cold  Acute bronchitis lasts about 3 to 6 weeks and is usually not a serious illness  Your cough can last for several weeks  DISCHARGE INSTRUCTIONS:   Return to the emergency department if:   · You cough up blood  · Your lips or fingernails turn blue  · You feel like you are not getting enough air when you breathe  Contact your healthcare provider if:   · You have a fever  · Your breathing problems do not go away or get worse  · Your cough does not get better within 4 weeks  · You have questions or concerns about your condition or care  Self-care:   · Get more rest   Rest helps your body to heal  Slowly start to do more each day  Rest when you feel it is needed  · Avoid irritants in the air  Avoid chemicals, fumes, and dust  Wear a face mask if you must work around dust or fumes  Stay inside on days when air pollution levels are high  If you have allergies, stay inside when pollen counts are high  Do not use aerosol products, such as spray-on deodorant, bug spray, and hair spray  · Do not smoke or be around others who smoke  Nicotine and other chemicals in cigarettes and cigars damages the cilia that move mucus out of your lungs  Ask your healthcare provider for information if you currently smoke and need help to quit  E-cigarettes or smokeless tobacco still contain nicotine  Talk to your healthcare provider before you use these products  · Drink liquids as directed  Liquids help keep your air passages moist and help you cough up mucus   You may need to drink more liquids when you have acute bronchitis  Ask how much liquid to drink each day and which liquids are best for you  · Use a humidifier or vaporizer  Use a cool mist humidifier or a vaporizer to increase air moisture in your home  This may make it easier for you to breathe and help decrease your cough  Decrease risk for acute bronchitis:   · Get the vaccinations you need  Ask your healthcare provider if you should get vaccinated against the flu or pneumonia  · Prevent the spread of germs  You can decrease your risk of acute bronchitis and other illnesses by doing the following:     [facility] your hands often with soap and water  Carry germ-killing hand lotion or gel with you  You can use the lotion or gel to clean your hands when soap and water are not available  ¨ Do not touch your eyes, nose, or mouth unless you have washed your hands first     ¨ Always cover your mouth when you cough to prevent the spread of germs  It is best to cough into a tissue or your shirt sleeve instead of into your hand  Ask those around you cover their mouths when they cough  ¨ Try to avoid people who have a cold or the flu  If you are sick, stay away from others as much as possible  Medicines: Your healthcare provider may  give you any of the following:  · Ibuprofen or acetaminophen  are medicines that help lower your fever  They are available without a doctor's order  Ask your healthcare provider which medicine is right for you  Ask how much to take and how often to take it  Follow directions  These medicines can cause stomach bleeding if not taken correctly  Ibuprofen can cause kidney damage  Do not take ibuprofen if you have kidney disease, an ulcer, or allergies to aspirin  Acetaminophen can cause liver damage  Do not take more than 4,000 milligrams in 24 hours  · Decongestants  help loosen mucus in your lungs and make it easier to cough up  This can help you breathe easier  · Cough suppressants  decrease your urge to cough   If your cough produces mucus, do not take a cough suppressant unless your healthcare provider tells you to  Your healthcare provider may suggest that you take a cough suppressant at night so you can rest     · Inhalers  may be given  Your healthcare provider may give you one or more inhalers to help you breathe easier and cough less  An inhaler gives your medicine to open your airways  Ask your healthcare provider to show you how to use your inhaler correctly  · Take your medicine as directed  Contact your healthcare provider if you think your medicine is not helping or if you have side effects  Tell him of her if you are allergic to any medicine  Keep a list of the medicines, vitamins, and herbs you take  Include the amounts, and when and why you take them  Bring the list or the pill bottles to follow-up visits  Carry your medicine list with you in case of an emergency  Follow up with your healthcare provider as directed:  Write down questions you have so you will remember to ask them during your follow-up visits  © 2017 [employer]  Information is for End User's use only and may not be sold, redistributed or otherwise used for commercial purposes  All illustrations and images included in CareNotes® are the copyrighted property of [employer]  or Darin Rowley  The above information is an  only  It is not intended as medical advice for individual conditions or treatments  Talk to your doctor, nurse or pharmacist before following any medical regimen to see if it is safe and effective for you  Pharyngitis   WHAT YOU NEED TO KNOW:   Pharyngitis, or sore throat, is inflammation of the tissues and structures in your pharynx (throat)  Pharyngitis is most often caused by bacteria  It may also be caused by a cold or flu virus  Other causes include smoking, allergies, or acid reflux     DISCHARGE INSTRUCTIONS:   Call 911 for any of the following:   · You have trouble breathing or swallowing because your throat is swollen or sore  Return to the emergency department if:   · You are drooling because it hurts too much to swallow  · Your fever is higher than 102? F (39?C) or lasts longer than 3 days  · You are confused  · You taste blood in your throat  Contact your healthcare provider if:   · Your throat pain gets worse  · You have a painful lump in your throat that does not go away after 5 days  · Your symptoms do not improve after 5 days  · You have questions or concerns about your condition or care  Medicines:  Viral pharyngitis will go away on its own without treatment  Your sore throat should start to feel better in 3 to 5 days for both viral and bacterial infections  You may need any of the following:  · Antibiotics  treat a bacterial infection  · NSAIDs , such as ibuprofen, help decrease swelling, pain, and fever  NSAIDs can cause stomach bleeding or kidney problems in certain people  If you take blood thinner medicine, always ask your healthcare provider if NSAIDs are safe for you  Always read the medicine label and follow directions  · Acetaminophen  decreases pain and fever  It is available without a doctor's order  Ask how much to take and how often to take it  Follow directions  Acetaminophen can cause liver damage if not taken correctly  · Take your medicine as directed  Contact your healthcare provider if you think your medicine is not helping or if you have side effects  Tell him or her if you are allergic to any medicine  Keep a list of the medicines, vitamins, and herbs you take  Include the amounts, and when and why you take them  Bring the list or the pill bottles to follow-up visits  Carry your medicine list with you in case of an emergency  Manage your symptoms:   · Gargle salt water  Mix ¼ teaspoon salt in an 8 ounce glass of warm water and gargle  This may help decrease swelling in your throat  · Drink liquids as directed    You may need to drink more liquids than usual  Liquids may help soothe your throat and prevent dehydration  Ask how much liquid to drink each day and which liquids are best for you  · Use a cool-steam humidifier  to help moisten the air in your room and calm your cough  · Soothe your throat  with cough drops, ice, soft foods, or popsicles  Prevent the spread of pharyngitis:  Cover your mouth and nose when you cough or sneeze  Do not share food or drinks  Wash your hands often  Use soap and water  If soap and water are unavailable, use an alcohol based hand   Follow up with your healthcare provider as directed:  Write down your questions so you remember to ask them during your visits  © 2017 [employer] Information is for End User's use only and may not be sold, redistributed or otherwise used for commercial purposes  All illustrations and images included in CareNotes® are the copyrighted property of [employer] Inc  or Darin Rowley  The above information is an  only  It is not intended as medical advice for individual conditions or treatments  Talk to your doctor, nurse or pharmacist before following any medical regimen to see if it is safe and effective for you

## 2018-05-18 NOTE — ED PROVIDER NOTES
History  Chief Complaint   Patient presents with    Cough     cough, sore throat ear pain worsening over the past week    Sore Throat     HPI  21-year-old white female with a chief complaint cough, sore throat and left ear pain which started about a week ago  Patient states the cough is nonproductive but started to radiate up into her throat and into her left ear over the last day or 2  Patient complains of difficulty swallowing  Patient denies any fever or chills  Prior to Admission Medications   Prescriptions Last Dose Informant Patient Reported? Taking?    B Complex Vitamins (B COMPLEX 50) TABS   Yes No   Sig: Take by mouth   Magnesium 100 MG TABS   Yes No   Sig: Take by mouth   Multiple Vitamin (MULTI-VITAMIN DAILY) TABS   Yes No   Sig: Take by mouth   Omega-3 Fatty Acids (FISH OIL) 1,000 mg   Yes No   Sig: Take by mouth   aspirin 81 MG tablet   Yes No   Sig: Take by mouth   calcium citrate-vitamin D (CITRACAL+D) 315-200 MG-UNIT per tablet   Yes No   Sig: Take by mouth   ibuprofen (MOTRIN) 600 mg tablet   No No   Sig: Take 1 tablet by mouth every 6 (six) hours as needed for mild pain for up to 3 days   lisinopril (ZESTRIL) 30 mg tablet   No No   Sig: TAKE 1 TABLET BY MOUTH EVERY DAY   methocarbamol (ROBAXIN) 750 mg tablet   No No   Sig: Take 1 tablet by mouth 3 (three) times a day for 5 days   montelukast (SINGULAIR) 10 mg tablet   No No   Sig: Take 1 tablet (10 mg total) by mouth daily at bedtime for 90 days   triamcinolone (KENALOG) 0 5 % cream   No No   Sig: Apply to affected area at night for 7 nights      Facility-Administered Medications: None       Past Medical History:   Diagnosis Date    Asymptomatic postmenopausal state 12/20/2017    [facility] necrosis of hip ([facility] [address] ) 07/23/2015    Dizziness     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 (HCC) 03/08/2016    Palpitations 05/01/2017    Shortness of breath        Past Surgical History:   Procedure Laterality Date    BREAST SURGERY      Puncture aspiration of Cyst    DILATION AND CURETTAGE OF UTERUS      HALLUX VALGUS CORRECTION  09/14/2016    HIP SURGERY Right 07/23/2015    SKIN BIOPSY         Family History   Problem Relation Age of Onset    Cancer Mother      Adenocarcinoma of the Gallbladder    Hypertension Mother     Brain cancer Father     Liver cancer Brother      I have reviewed and agree with the history as documented  Social History   Substance Use Topics    Smoking status: Never Smoker    Smokeless tobacco: Never Used    Alcohol use 4 2 oz/week     7 Glasses of wine per week      Comment: Social Drinker        Review of Systems   Constitutional: Negative for chills and fever  HENT: Positive for ear pain, sinus pain and sore throat  Negative for congestion and rhinorrhea  Eyes: Negative for discharge and visual disturbance  Respiratory: Positive for cough  Negative for shortness of breath and wheezing  Cardiovascular: Negative for chest pain and palpitations  Gastrointestinal: Negative for abdominal pain and vomiting  Endocrine: Negative for polydipsia and polyuria  Genitourinary: Negative for dysuria and hematuria  Musculoskeletal: Negative for arthralgias, gait problem and neck stiffness  Skin: Negative for rash and wound  Neurological: Negative for dizziness and headaches  Psychiatric/Behavioral: Negative for confusion and suicidal ideas  Physical Exam  ED Triage Vitals [05/1950]   Temperature Pulse Respirations Blood Pressure SpO2   98 2 °F (36 8 °C) 69 16 (!) 178/85 96 %      Temp Source Heart Rate Source Patient Position - Orthostatic VS BP Location FiO2 (%)   Oral Monitor Sitting Right arm --      Pain Score       7           Orthostatic Vital Signs  Vitals:    05/1950   BP: (!) 178/85   Pulse: 69   Patient Position - Orthostatic VS: Sitting       Physical Exam   Constitutional: She is oriented to person, place, and time   She appears well-developed and well-nourished  65-year-old white female sitting on the edge of the stretcher in no acute distress  Patient is congested and has a dry cough  HENT:   Head: Normocephalic and atraumatic  Left ear: There is some edema of the left ear  There is no evidence of erythema  Right ear is clear  There is posterior pharyngeal erythema bilaterally with some exudate on the left  Eyes: EOM are normal  Pupils are equal, round, and reactive to light  Neck: Normal range of motion  Neck supple  Cardiovascular: Normal rate, regular rhythm and normal heart sounds  Pulmonary/Chest: Effort normal and breath sounds normal  No respiratory distress  She has no wheezes  She exhibits no tenderness  Positive dry cough   Abdominal: Soft  Bowel sounds are normal  There is no tenderness  There is no rebound and no guarding  Musculoskeletal: Normal range of motion  Lymphadenopathy:     She has cervical adenopathy (Patient has cervical adenopathy on the left greater than the right)  Neurological: She is alert and oriented to person, place, and time  No cranial nerve deficit  She exhibits normal muscle tone  Coordination normal    Skin: Skin is warm and dry  Psychiatric: She has a normal mood and affect  Nursing note and vitals reviewed  ED Medications  Medications   azithromycin (ZITHROMAX) tablet 500 mg (500 mg Oral Given 5/17/18 2028)       Diagnostic Studies  Results Reviewed     None                 No orders to display              Procedures  Procedures       Phone Contacts  ED Phone Contact    ED Course                               MDM  CritCare Time     Differential diagnosis includes:  1  Bronchitis  2  Pharyngitis  3  Cervical adenitis  4  Otitis media  5  Strep throat  6    Sinusitis    Disposition  Final diagnoses:   Bronchitis   Pharyngitis   Left otitis media     Time reflects when diagnosis was documented in both MDM as applicable and the Disposition within this note     Time User Action Codes Description Comment    5/17/2018  8:11 PM Mika Hernández Add [J40] Bronchitis     5/17/2018  8:11 PM Mika Hernández Add [J02 9] Pharyngitis     5/17/2018  8:11 PM Mika Hernández Add [H66 92] Left otitis media       ED Disposition     ED Disposition Condition Comment    Discharge  Autumn Fregoso discharge to home/self care  Condition at discharge: Good        Follow-up Information     Follow up With Specialties Details Why [address], DO Internal Medicine In 1 week  [address] [place], [place] [address]ondina Hernandezsaadia BELL [address]          Discharge Medication List as of 5/17/2018  8:13 PM      CONTINUE these medications which have NOT CHANGED    Details   aspirin 81 MG tablet Take by mouth, Historical Med      B Complex Vitamins (B COMPLEX 50) TABS Take by mouth, Historical Med      calcium citrate-vitamin D (CITRACAL+D) 315-200 MG-UNIT per tablet Take by mouth, Historical Med      ibuprofen (MOTRIN) 600 mg tablet Take 1 tablet by mouth every 6 (six) hours as needed for mild pain for up to 3 days, Starting Sat 10/7/2017, Until Tue 10/10/2017, Print      lisinopril (ZESTRIL) 30 mg tablet TAKE 1 TABLET BY MOUTH EVERY DAY, Normal      Magnesium 100 MG TABS Take by mouth, Historical Med      methocarbamol (ROBAXIN) 750 mg tablet Take 1 tablet by mouth 3 (three) times a day for 5 days, Starting Sat 10/7/2017, Until Thu 10/12/2017, Print      montelukast (SINGULAIR) 10 mg tablet Take 1 tablet (10 mg total) by mouth daily at bedtime for 90 days, Starting Tue 4/3/2018, Until Mon 7/2/2018, Normal      Multiple Vitamin (MULTI-VITAMIN DAILY) TABS Take by mouth, Historical Med      Omega-3 Fatty Acids (FISH OIL) 1,000 mg Take by mouth, Starting Thu 7/23/2015, Historical Med      triamcinolone (KENALOG) 0 5 % cream Apply to affected area at night for 7 nights, Normal           No discharge procedures on file      ED Provider  Electronically Signed by           Ahmet Bales DO  05/18/18 0128

## 2018-07-01 DIAGNOSIS — J30.9 CHRONIC ALLERGIC RHINITIS: ICD-10-CM

## 2018-07-02 RX ORDER — MONTELUKAST SODIUM 10 MG/1
TABLET ORAL
Qty: 90 TABLET | Refills: 0 | Status: SHIPPED | OUTPATIENT
Start: 2018-07-02 | End: 2018-09-04 | Stop reason: SDUPTHER

## 2018-08-01 DIAGNOSIS — I10 HYPERTENSION, UNSPECIFIED TYPE: ICD-10-CM

## 2018-08-01 RX ORDER — LISINOPRIL 30 MG/1
TABLET ORAL
Qty: 30 TABLET | Refills: 1 | Status: SHIPPED | OUTPATIENT
Start: 2018-08-01 | End: 2018-10-04 | Stop reason: SDUPTHER

## 2018-09-04 DIAGNOSIS — J30.9 CHRONIC ALLERGIC RHINITIS: ICD-10-CM

## 2018-09-04 RX ORDER — MONTELUKAST SODIUM 10 MG/1
10 TABLET ORAL
Qty: 90 TABLET | Refills: 1 | Status: SHIPPED | OUTPATIENT
Start: 2018-09-04 | End: 2019-05-21 | Stop reason: SDUPTHER

## 2018-10-04 DIAGNOSIS — I10 HYPERTENSION, UNSPECIFIED TYPE: ICD-10-CM

## 2018-10-04 RX ORDER — LISINOPRIL 30 MG/1
TABLET ORAL
Qty: 30 TABLET | Refills: 3 | Status: SHIPPED | OUTPATIENT
Start: 2018-10-04 | End: 2018-12-17 | Stop reason: SDUPTHER

## 2018-10-25 ENCOUNTER — OFFICE VISIT (OUTPATIENT)
Dept: FAMILY MEDICINE CLINIC | Facility: CLINIC | Age: 68
End: 2018-10-25
Payer: MEDICARE

## 2018-10-25 VITALS
WEIGHT: 208 LBS | BODY MASS INDEX: 32.65 KG/M2 | DIASTOLIC BLOOD PRESSURE: 80 MMHG | TEMPERATURE: 98.3 F | HEART RATE: 84 BPM | RESPIRATION RATE: 15 BRPM | HEIGHT: 67 IN | SYSTOLIC BLOOD PRESSURE: 148 MMHG

## 2018-10-25 DIAGNOSIS — J30.89 ENVIRONMENTAL AND SEASONAL ALLERGIES: ICD-10-CM

## 2018-10-25 DIAGNOSIS — E66.09 CLASS 1 OBESITY DUE TO EXCESS CALORIES WITH SERIOUS COMORBIDITY AND BODY MASS INDEX (BMI) OF 32.0 TO 32.9 IN ADULT: ICD-10-CM

## 2018-10-25 DIAGNOSIS — E55.9 MILD VITAMIN D DEFICIENCY: ICD-10-CM

## 2018-10-25 DIAGNOSIS — Z23 NEED FOR PNEUMOCOCCAL VACCINATION: ICD-10-CM

## 2018-10-25 DIAGNOSIS — I10 ESSENTIAL HYPERTENSION: Primary | ICD-10-CM

## 2018-10-25 DIAGNOSIS — Z13.220 SCREENING FOR HYPERLIPIDEMIA: ICD-10-CM

## 2018-10-25 DIAGNOSIS — Z23 NEED FOR IMMUNIZATION AGAINST INFLUENZA: ICD-10-CM

## 2018-10-25 DIAGNOSIS — Z13.1 SCREENING FOR DIABETES MELLITUS: ICD-10-CM

## 2018-10-25 PROBLEM — K64.5 HEMORRHOID THROMBOSIS: Status: RESOLVED | Noted: 2018-03-23 | Resolved: 2018-10-25

## 2018-10-25 PROCEDURE — G0009 ADMIN PNEUMOCOCCAL VACCINE: HCPCS

## 2018-10-25 PROCEDURE — 90732 PPSV23 VACC 2 YRS+ SUBQ/IM: CPT

## 2018-10-25 PROCEDURE — 90662 IIV NO PRSV INCREASED AG IM: CPT | Performed by: FAMILY MEDICINE

## 2018-10-25 PROCEDURE — 99214 OFFICE O/P EST MOD 30 MIN: CPT | Performed by: FAMILY MEDICINE

## 2018-10-25 PROCEDURE — G0008 ADMIN INFLUENZA VIRUS VAC: HCPCS | Performed by: FAMILY MEDICINE

## 2018-10-25 RX ORDER — LISINOPRIL 30 MG/1
1 TABLET ORAL DAILY
COMMUNITY
Start: 2016-02-09 | End: 2018-10-25 | Stop reason: CLARIF

## 2018-10-25 RX ORDER — MONTELUKAST SODIUM 10 MG/1
1 TABLET ORAL
COMMUNITY
Start: 2017-03-16 | End: 2018-10-25 | Stop reason: CLARIF

## 2018-10-25 NOTE — ASSESSMENT & PLAN NOTE
Wt Readings from Last 3 Encounters:   10/25/18 94 3 kg (208 lb)   05/17/18 95 3 kg (210 lb)   03/23/18 93 9 kg (207 lb)     -Body mass index is 32 58 kg/m²    -Reviewed healthy diet- high in protein, low in carbohydrates, high calorie/low nutrition foods, try to stop drinking regular and diet sodas  -Drink at least 8 glasses of pure water a day  -Get at least 30 minutes of breathless exercise 4-5 days per week  Screening FLP and A1c  Goal to be under 200 pounds at follow-up in 3-6 months

## 2018-10-25 NOTE — ASSESSMENT & PLAN NOTE
BP Readings from Last 3 Encounters:   10/25/18 148/80   05/17/18 (!) 178/85   03/23/18 120/84     Controlled on current regimen:  -Lisinopril 30mg  -Check BMP  -Handout on DASH diet provided to patient with guidance on weight loss

## 2018-10-25 NOTE — PROGRESS NOTES
FAMILY MEDICINE PROGRESS NOTE  Vira Camp 70 y o  female   DATE: October 25, 2018     ASSESSMENT and PLAN:  Vira Camp is a 70 y o  female with:     Class 1 obesity due to excess calories with serious comorbidity and body mass index (BMI) of 32 0 to 32 9 in adult  Wt Readings from Last 3 Encounters:   10/25/18 94 3 kg (208 lb)   05/17/18 95 3 kg (210 lb)   03/23/18 93 9 kg (207 lb)     -Body mass index is 32 58 kg/m²  -Reviewed healthy diet- high in protein, low in carbohydrates, high calorie/low nutrition foods, try to stop drinking regular and diet sodas  -Drink at least 8 glasses of pure water a day  -Get at least 30 minutes of breathless exercise 4-5 days per week  Screening FLP and A1c  Goal to be under 200 pounds at follow-up in 3-6 months    Essential hypertension  BP Readings from Last 3 Encounters:   10/25/18 148/80   05/17/18 (!) 178/85   03/23/18 120/84     Controlled on current regimen:  -Lisinopril 30mg  -Check BMP  -Handout on DASH diet provided to patient with guidance on weight loss      Mild vitamin D deficiency  Recheck Vit D    Environmental and seasonal allergies   Well controlled with Singulair    Need for immunization against influenza  Vaccine Counseling: Discussed with: Patient  The benefits, contraindication and side effects for the following vaccines were reviewed: Immunization component list: influenza, PNA  Total number of components reveiwed:2      RTC 3-6 months    SUBJECTIVE:  Vira Camp is a 70 y o  female who presents today with a chief complaint of Follow-up (medication refill)  Pt is here for her 6 month f/u      1  HTN- controlled on Lisinopril  Has a monitor at home and it usually is 125-135/70s  2  Is on a lot of heart vitamins, wants to know if she needs them  3  Has been on Singulair for 1 year and thinks it has helped with her breathing and her allergies  4  No longer has hemorrhoids after using Triamcinolone  5   Weight- hasnt been able to lose weight      Review of Systems   Constitutional: Negative for fatigue  Respiratory: Negative for shortness of breath  Cardiovascular: Negative for chest pain and leg swelling  I have reviewed the patient's PMH, Social History, Medication List and Allergies as appropriate  OBJECTIVE:  /80 (BP Location: Left arm, Patient Position: Sitting, Cuff Size: Large)   Pulse 84   Temp 98 3 °F (36 8 °C)   Resp 15   Ht 5' 7" (1 702 m)   Wt 94 3 kg (208 lb)   Breastfeeding? No   BMI 32 58 kg/m²    Physical Exam   Constitutional: She appears well-developed and well-nourished  No distress  obese   Cardiovascular: Normal rate, regular rhythm and normal heart sounds  Pulmonary/Chest: Effort normal and breath sounds normal  No respiratory distress  She has no wheezes  She has no rales  Skin: She is not diaphoretic  Vitals reviewed  Cony Antonio MD    Note: Portions of the record may have been created with voice recognition software  Occasional wrong word or "sound a like" substitutions may have occurred due to the inherent limitations of voice recognition software  Read the chart carefully and recognize, using context, where substitutions have occurred

## 2018-10-25 NOTE — PATIENT INSTRUCTIONS
DASH Eating Plan   AMBULATORY CARE:   The DASH (Dietary Approaches to Stop Hypertension) Eating Plan  is designed to help prevent or lower high blood pressure  It can also help to lower LDL (bad) cholesterol and decrease your risk for heart disease  The plan is low in sodium, sugar, unhealthy fats, and total fat  It is high in potassium, calcium, magnesium, and fiber  These nutrients are added when you eat more fruits, vegetables, and whole grains  Your sodium limit each day: Your dietitian will tell you how much sodium is safe for you to have each day  People with high blood pressure should have no more than 1,500 to 2,300 mg of sodium in a day  A teaspoon (tsp) of salt has 2,300 mg of sodium  This may seem like a difficult goal, but small changes to the foods you eat can make a big difference  Your healthcare provider or dietitian can help you create a meal plan that follows your sodium limit  How to limit sodium:   · Read food labels  Food labels can help you choose foods that are low in sodium  The amount of sodium is listed in milligrams (mg)  The % Daily Value (DV) column tells you how much of your daily needs are met by 1 serving of the food for each nutrient listed  Choose foods that have less than 5% of the DV of sodium  These foods are considered low in sodium  Foods that have 20% or more of the DV of sodium are considered high in sodium  Avoid foods that have more than 300 mg of sodium in each serving  Choose foods that say low-sodium, reduced-sodium, or no salt added on the food label  · Avoid salt  Do not salt food at the table, and add very little salt to foods during cooking  Use herbs and spices, such as onions, garlic, and salt-free seasonings to add flavor to foods  Try lemon or lime juice or vinegar to give foods a tart flavor  Use hot peppers or a small amount of hot pepper sauce to add a spicy flavor to foods  · Ask about salt substitutes    Ask your healthcare provider if you may use salt substitutes  Some salt substitutes have ingredients that can be harmful if you have certain health conditions  · Choose foods carefully at restaurants  Meals from restaurants, especially fast food restaurants, are often high in sodium  Some restaurants have nutrition information that tells you the amount of sodium in their foods  Ask to have your food prepared with less, or no salt  What you need to know about fats:   · Include healthy fats  Examples are unsaturated fats and omega-3 fatty acids  Unsaturated fats are found in soybean, canola, olive, or sunflower oil, and liquid and soft tub margarines  Omega-3 fatty acids are found in fatty fish, such as salmon, tuna, mackerel, and sardines  It is also found in flaxseed oil and ground flaxseed  · Avoid unhealthy fats  Do not eat unhealthy fats, such as saturated fats and trans fats  Saturated fats are found in foods that contain fat from animals  Examples are fatty meats, whole milk, butter, cream, and other dairy foods  It is also found in shortening, stick margarine, palm oil, and coconut oil  Trans fats are found in fried foods, crackers, chips, and baked goods made with margarine or shortening  Foods to include: With the DASH eating plan, you need to eat a certain number of servings from each food group  This will help you get enough of certain nutrients and limit others  The amount of servings you should eat depends on how many calories you need  Your dietitian can tell you how many calories you need  The number of servings listed next to the food groups below are for people who need about 2,000 calories each day    · Grains:  6 to 8 servings (3 of these servings should be whole-grain foods)    ¨ 1 slice of whole-grain bread     ¨ 1 ounce of dry cereal    ¨ ½ cup of cooked cereal, pasta, or brown rice    · Vegetables and fruits:  4 to 5 servings of fruits and 4 to 5 servings of vegetables    ¨ 1 medium fruit    ¨ ½ cup of frozen, canned (no added salt), or chopped fresh vegetables     ¨ ½ cup of fresh, frozen, dried, or canned fruit (canned in light syrup or fruit juice)    ¨ ½ cup of vegetable or fruit juice    · Dairy:  2 to 3 servings    ¨ 1 cup of nonfat (skim) or 1% milk    ¨ 1½ ounces of fat-free or low-fat cheese    ¨ 6 ounces of nonfat or low-fat yogurt    · Lean meat, poultry, and fish:  6 ounces or less    [employer] (chicken, turkey) with no skin    ¨ Fish (especially fatty fish, such as salmon, fresh tuna, or mackerel)    ¨ Lean beef and pork (loin, round, extra lean hamburger)    ¨ Egg whites and egg substitutes    · Nuts, seeds, and legumes:  4 to 5 servings each week    ¨ ½ cup of cooked beans and peas    ¨ 1½ ounces of unsalted nuts    ¨ 2 tablespoons of peanut butter or seeds    · Sweets and added sugars:  5 or less each week    ¨ 1 tablespoon of sugar, jelly, or jam    ¨ ½ cup of sorbet or gelatin    ¨ 1 cup of lemonade    · Fats:  2 to 3 servings each week    ¨ 1 teaspoon of soft margarine or vegetable oil    ¨ 1 tablespoon of mayonnaise    ¨ 2 tablespoons of salad dressing  Foods to avoid:   · Grains:      [employer], such as doughnuts, pastries, cookies, and biscuits (high in fat and sugar)    ¨ Mixes for cornbread and biscuits, packaged foods, such as bread stuffing, rice and pasta mixes, macaroni and cheese, and instant cereals (high in sodium)    · Fruits and vegetables:      ¨ Regular, canned vegetables (high in sodium)    ¨ Sauerkraut, pickled vegetables, and other foods prepared in brine (high in sodium)    ¨ Fried vegetables or vegetables in butter or high-fat sauces    ¨ Fruit in cream or butter sauce (high in fat)    · Dairy:      ¨ Whole milk, 2% milk, and cream (high in fat)    ¨ Regular cheese and processed cheese (high in fat and sodium)    · Meats and protein foods:      ¨ Smoked or cured meat, such as corned beef, madrid, ham, hot dogs, and sausage (high in fat and sodium)    ¨ Canned beans and canned meats or spreads, such as potted meats, sardines, anchovies, and imitation seafood (high in sodium)    ¨ Deli or lunch meats, such as bologna, ham, turkey, and roast beef (high in sodium)    ¨ High-fat meat (T-bone steak, regular hamburger, and ribs)    ¨ Whole eggs and egg yolks (high in fat)    · Other:      ¨ Seasonings made with salt, such as garlic salt, celery salt, onion salt, seasoned salt, meat tenderizers, and monosodium glutamate (MSG)    ¨ Miso soup and canned or dried soup mixes (high in sodium)    ¨ Regular soy sauce, barbecue sauce, teriyaki sauce, steak sauce, Worcestershire sauce, and most flavored vinegars (high in sodium)    ¨ Regular condiments, such as mustard, ketchup, and salad dressings (high in sodium)    ¨ Gravy and sauces, such as [place] or cheese sauces (high in sodium and fat)    ¨ Drinks high in sugar, such as soda or fruit drinks    [employer] foods, such as salted chips, popcorn, pretzels, pork rinds, salted crackers, and salted nuts    ¨ Frozen foods, such as dinners, entrees, vegetables with sauces, and breaded meats (high in sodium)  Other guidelines to follow:   · Maintain a healthy weight  Your risk for heart disease is higher if you are overweight  Your healthcare provider may suggest that you lose weight if you are overweight  You can lose weight by eating fewer calories and foods that have added sugars and fat  The DASH meal plan can help you do this  Decrease calories by eating smaller portions at each meal and fewer snacks  Ask your healthcare provider for more information about how to lose weight  · Exercise regularly  Regular exercise can help you reach or maintain a healthy weight  Regular exercise can also help decrease your blood pressure and improve your cholesterol levels  Get 30 minutes or more of moderate exercise each day of the week  To lose weight, get at least 60 minutes of exercise  Talk to your healthcare provider about the best exercise program for you      · Limit alcohol  Women should limit alcohol to 1 drink a day  Men should limit alcohol to 2 drinks a day  A drink of alcohol is 12 ounces of beer, 5 ounces of wine, or 1½ ounces of liquor  © 2017 [address] Information is for End User's use only and may not be sold, redistributed or otherwise used for commercial purposes  All illustrations and images included in CareNotes® are the copyrighted property of [employer] , [employer]  or Darin Rowley  The above information is an  only  It is not intended as medical advice for individual conditions or treatments  Talk to your doctor, nurse or pharmacist before following any medical regimen to see if it is safe and effective for you

## 2018-10-25 NOTE — ASSESSMENT & PLAN NOTE
Vaccine Counseling: Discussed with: Patient  The benefits, contraindication and side effects for the following vaccines were reviewed: Immunization component list: influenza, PNA    Total number of components reveiwed:2

## 2018-12-17 DIAGNOSIS — I10 HYPERTENSION, UNSPECIFIED TYPE: ICD-10-CM

## 2018-12-17 RX ORDER — LISINOPRIL 30 MG/1
TABLET ORAL
Qty: 30 TABLET | Refills: 3 | Status: SHIPPED | OUTPATIENT
Start: 2018-12-17 | End: 2019-03-16 | Stop reason: SDUPTHER

## 2019-03-16 DIAGNOSIS — I10 HYPERTENSION, UNSPECIFIED TYPE: ICD-10-CM

## 2019-03-16 RX ORDER — LISINOPRIL 30 MG/1
TABLET ORAL
Qty: 30 TABLET | Refills: 2 | Status: SHIPPED | OUTPATIENT
Start: 2019-03-16 | End: 2019-04-25 | Stop reason: SDUPTHER

## 2019-04-15 LAB — HBA1C MFR BLD HPLC: 5.7 %

## 2019-04-25 ENCOUNTER — OFFICE VISIT (OUTPATIENT)
Dept: FAMILY MEDICINE CLINIC | Facility: CLINIC | Age: 69
End: 2019-04-25
Payer: MEDICARE

## 2019-04-25 VITALS
OXYGEN SATURATION: 97 % | HEART RATE: 74 BPM | WEIGHT: 208 LBS | BODY MASS INDEX: 32.65 KG/M2 | TEMPERATURE: 98.3 F | DIASTOLIC BLOOD PRESSURE: 76 MMHG | RESPIRATION RATE: 16 BRPM | HEIGHT: 67 IN | SYSTOLIC BLOOD PRESSURE: 140 MMHG

## 2019-04-25 DIAGNOSIS — M25.561 CHRONIC PAIN OF BOTH KNEES: ICD-10-CM

## 2019-04-25 DIAGNOSIS — J30.89 ENVIRONMENTAL AND SEASONAL ALLERGIES: ICD-10-CM

## 2019-04-25 DIAGNOSIS — E55.9 VITAMIN D DEFICIENCY: ICD-10-CM

## 2019-04-25 DIAGNOSIS — E66.09 CLASS 1 OBESITY DUE TO EXCESS CALORIES WITH SERIOUS COMORBIDITY AND BODY MASS INDEX (BMI) OF 32.0 TO 32.9 IN ADULT: ICD-10-CM

## 2019-04-25 DIAGNOSIS — M25.562 CHRONIC PAIN OF BOTH KNEES: ICD-10-CM

## 2019-04-25 DIAGNOSIS — G89.29 CHRONIC PAIN OF BOTH KNEES: ICD-10-CM

## 2019-04-25 DIAGNOSIS — I10 ESSENTIAL HYPERTENSION: Primary | ICD-10-CM

## 2019-04-25 DIAGNOSIS — E74.39 GLUCOSE INTOLERANCE: ICD-10-CM

## 2019-04-25 PROCEDURE — 1111F DSCHRG MED/CURRENT MED MERGE: CPT | Performed by: FAMILY MEDICINE

## 2019-04-25 PROCEDURE — 99214 OFFICE O/P EST MOD 30 MIN: CPT | Performed by: FAMILY MEDICINE

## 2019-04-25 RX ORDER — ACETAMINOPHEN 500 MG
1000 TABLET ORAL EVERY 6 HOURS PRN
Qty: 30 TABLET | Refills: 0 | Status: SHIPPED | OUTPATIENT
Start: 2019-04-25

## 2019-04-25 RX ORDER — LISINOPRIL 30 MG/1
30 TABLET ORAL DAILY
Qty: 90 TABLET | Refills: 1 | Status: SHIPPED | OUTPATIENT
Start: 2019-04-25 | End: 2019-12-16 | Stop reason: SDUPTHER

## 2019-04-25 RX ORDER — LISINOPRIL 30 MG/1
30 TABLET ORAL DAILY
Qty: 90 TABLET | Refills: 1 | Status: SHIPPED | OUTPATIENT
Start: 2019-04-25 | End: 2019-04-25 | Stop reason: SDUPTHER

## 2019-05-21 DIAGNOSIS — J30.9 CHRONIC ALLERGIC RHINITIS: ICD-10-CM

## 2019-05-21 RX ORDER — MONTELUKAST SODIUM 10 MG/1
10 TABLET ORAL
Qty: 90 TABLET | Refills: 1 | Status: SHIPPED | OUTPATIENT
Start: 2019-05-21

## 2019-12-16 DIAGNOSIS — I10 ESSENTIAL HYPERTENSION: ICD-10-CM

## 2019-12-16 RX ORDER — LISINOPRIL 30 MG/1
TABLET ORAL
Qty: 90 TABLET | Refills: 1 | Status: SHIPPED | OUTPATIENT
Start: 2019-12-16

## 2020-06-09 DIAGNOSIS — I10 ESSENTIAL HYPERTENSION: ICD-10-CM

## 2020-06-09 RX ORDER — LISINOPRIL 30 MG/1
TABLET ORAL
Qty: 90 TABLET | Refills: 1 | OUTPATIENT
Start: 2020-06-09

## 2021-02-04 NOTE — DISCHARGE INSTRUCTIONS
